# Patient Record
Sex: MALE | Race: WHITE | NOT HISPANIC OR LATINO | Employment: OTHER
[De-identification: names, ages, dates, MRNs, and addresses within clinical notes are randomized per-mention and may not be internally consistent; named-entity substitution may affect disease eponyms.]

---

## 2017-01-05 ENCOUNTER — RECORDS - HEALTHEAST (OUTPATIENT)
Dept: ADMINISTRATIVE | Facility: OTHER | Age: 77
End: 2017-01-05

## 2017-02-16 ENCOUNTER — COMMUNICATION - HEALTHEAST (OUTPATIENT)
Dept: SCHEDULING | Facility: CLINIC | Age: 77
End: 2017-02-16

## 2017-02-16 DIAGNOSIS — E11.9 DIABETES MELLITUS, TYPE 2 (H): ICD-10-CM

## 2017-02-23 ENCOUNTER — COMMUNICATION - HEALTHEAST (OUTPATIENT)
Dept: SCHEDULING | Facility: CLINIC | Age: 77
End: 2017-02-23

## 2017-02-23 DIAGNOSIS — I10 HTN (HYPERTENSION): ICD-10-CM

## 2017-02-24 ENCOUNTER — COMMUNICATION - HEALTHEAST (OUTPATIENT)
Dept: FAMILY MEDICINE | Facility: CLINIC | Age: 77
End: 2017-02-24

## 2017-02-24 DIAGNOSIS — I10 HTN (HYPERTENSION): ICD-10-CM

## 2017-05-26 ENCOUNTER — COMMUNICATION - HEALTHEAST (OUTPATIENT)
Dept: FAMILY MEDICINE | Facility: CLINIC | Age: 77
End: 2017-05-26

## 2017-05-26 DIAGNOSIS — I10 HTN (HYPERTENSION): ICD-10-CM

## 2017-05-26 DIAGNOSIS — E11.9 TYPE 2 DIABETES MELLITUS (H): ICD-10-CM

## 2017-06-09 ENCOUNTER — OFFICE VISIT - HEALTHEAST (OUTPATIENT)
Dept: FAMILY MEDICINE | Facility: CLINIC | Age: 77
End: 2017-06-09

## 2017-06-09 DIAGNOSIS — Z00.01 ENCOUNTER FOR GENERAL ADULT MEDICAL EXAMINATION WITH ABNORMAL FINDINGS: ICD-10-CM

## 2017-06-09 DIAGNOSIS — Z13.220 ENCOUNTER FOR SCREENING FOR LIPOID DISORDERS: ICD-10-CM

## 2017-06-09 DIAGNOSIS — Z12.5 SCREENING FOR MALIGNANT NEOPLASM OF PROSTATE: ICD-10-CM

## 2017-06-09 DIAGNOSIS — E11.9 DIABETES MELLITUS (H): ICD-10-CM

## 2017-06-09 DIAGNOSIS — E11.9 DIABETES MELLITUS, TYPE 2 (H): ICD-10-CM

## 2017-06-09 LAB
CHOLEST SERPL-MCNC: 122 MG/DL
FASTING STATUS PATIENT QL REPORTED: NORMAL
HBA1C MFR BLD: 7.3 % (ref 3.5–6)
HDLC SERPL-MCNC: 48 MG/DL
LDLC SERPL CALC-MCNC: 59 MG/DL
PSA SERPL-MCNC: 1 NG/ML (ref 0–6.5)
TRIGL SERPL-MCNC: 76 MG/DL

## 2017-06-09 ASSESSMENT — MIFFLIN-ST. JEOR: SCORE: 1543.39

## 2017-06-11 ENCOUNTER — AMBULATORY - HEALTHEAST (OUTPATIENT)
Dept: FAMILY MEDICINE | Facility: CLINIC | Age: 77
End: 2017-06-11

## 2017-06-11 DIAGNOSIS — E87.5 HYPERKALEMIA: ICD-10-CM

## 2017-06-23 ENCOUNTER — AMBULATORY - HEALTHEAST (OUTPATIENT)
Dept: LAB | Facility: CLINIC | Age: 77
End: 2017-06-23

## 2017-06-23 DIAGNOSIS — E87.5 HYPERKALEMIA: ICD-10-CM

## 2017-08-03 ENCOUNTER — COMMUNICATION - HEALTHEAST (OUTPATIENT)
Dept: FAMILY MEDICINE | Facility: CLINIC | Age: 77
End: 2017-08-03

## 2017-08-03 DIAGNOSIS — E78.5 HYPERLIPIDEMIA: ICD-10-CM

## 2017-08-28 ENCOUNTER — COMMUNICATION - HEALTHEAST (OUTPATIENT)
Dept: FAMILY MEDICINE | Facility: CLINIC | Age: 77
End: 2017-08-28

## 2017-08-28 DIAGNOSIS — I10 HTN (HYPERTENSION): ICD-10-CM

## 2017-08-29 ENCOUNTER — COMMUNICATION - HEALTHEAST (OUTPATIENT)
Dept: FAMILY MEDICINE | Facility: CLINIC | Age: 77
End: 2017-08-29

## 2017-08-29 ENCOUNTER — COMMUNICATION - HEALTHEAST (OUTPATIENT)
Dept: SCHEDULING | Facility: CLINIC | Age: 77
End: 2017-08-29

## 2017-10-12 ENCOUNTER — COMMUNICATION - HEALTHEAST (OUTPATIENT)
Dept: FAMILY MEDICINE | Facility: CLINIC | Age: 77
End: 2017-10-12

## 2017-10-12 DIAGNOSIS — I10 HTN (HYPERTENSION): ICD-10-CM

## 2017-10-16 ENCOUNTER — COMMUNICATION - HEALTHEAST (OUTPATIENT)
Dept: FAMILY MEDICINE | Facility: CLINIC | Age: 77
End: 2017-10-16

## 2017-10-16 DIAGNOSIS — I10 HTN (HYPERTENSION): ICD-10-CM

## 2017-10-31 ENCOUNTER — COMMUNICATION - HEALTHEAST (OUTPATIENT)
Dept: FAMILY MEDICINE | Facility: CLINIC | Age: 77
End: 2017-10-31

## 2017-10-31 DIAGNOSIS — E78.5 HYPERLIPIDEMIA: ICD-10-CM

## 2017-11-28 ENCOUNTER — COMMUNICATION - HEALTHEAST (OUTPATIENT)
Dept: LAB | Facility: CLINIC | Age: 77
End: 2017-11-28

## 2017-11-28 DIAGNOSIS — E11.9 TYPE 2 DIABETES MELLITUS (H): ICD-10-CM

## 2017-11-30 ENCOUNTER — AMBULATORY - HEALTHEAST (OUTPATIENT)
Dept: LAB | Facility: CLINIC | Age: 77
End: 2017-11-30

## 2017-11-30 DIAGNOSIS — E11.9 TYPE 2 DIABETES MELLITUS (H): ICD-10-CM

## 2017-11-30 LAB
CHOLEST SERPL-MCNC: 130 MG/DL
FASTING STATUS PATIENT QL REPORTED: YES
HBA1C MFR BLD: 7.1 % (ref 3.5–6)
HDLC SERPL-MCNC: 46 MG/DL
LDLC SERPL CALC-MCNC: 69 MG/DL
TRIGL SERPL-MCNC: 73 MG/DL

## 2018-01-04 ENCOUNTER — RECORDS - HEALTHEAST (OUTPATIENT)
Dept: ADMINISTRATIVE | Facility: OTHER | Age: 78
End: 2018-01-04

## 2018-01-25 ENCOUNTER — COMMUNICATION - HEALTHEAST (OUTPATIENT)
Dept: SCHEDULING | Facility: CLINIC | Age: 78
End: 2018-01-25

## 2018-01-25 DIAGNOSIS — E11.9 DIABETES MELLITUS, TYPE 2 (H): ICD-10-CM

## 2018-04-15 ENCOUNTER — COMMUNICATION - HEALTHEAST (OUTPATIENT)
Dept: FAMILY MEDICINE | Facility: CLINIC | Age: 78
End: 2018-04-15

## 2018-04-15 DIAGNOSIS — I10 HTN (HYPERTENSION): ICD-10-CM

## 2018-04-20 ENCOUNTER — COMMUNICATION - HEALTHEAST (OUTPATIENT)
Dept: FAMILY MEDICINE | Facility: CLINIC | Age: 78
End: 2018-04-20

## 2018-04-20 DIAGNOSIS — E78.5 HYPERLIPIDEMIA: ICD-10-CM

## 2018-04-20 DIAGNOSIS — E11.9 TYPE 2 DIABETES MELLITUS (H): ICD-10-CM

## 2018-06-19 ENCOUNTER — OFFICE VISIT - HEALTHEAST (OUTPATIENT)
Dept: FAMILY MEDICINE | Facility: CLINIC | Age: 78
End: 2018-06-19

## 2018-06-19 DIAGNOSIS — E78.5 HYPERLIPIDEMIA LDL GOAL <100: ICD-10-CM

## 2018-06-19 DIAGNOSIS — I10 ESSENTIAL HYPERTENSION: ICD-10-CM

## 2018-06-19 DIAGNOSIS — Z00.00 ROUTINE GENERAL MEDICAL EXAMINATION AT A HEALTH CARE FACILITY: ICD-10-CM

## 2018-06-19 DIAGNOSIS — E87.5 HYPERPOTASSEMIA: ICD-10-CM

## 2018-06-19 DIAGNOSIS — E11.9 DIABETES MELLITUS, TYPE 2 (H): ICD-10-CM

## 2018-06-19 LAB
ALBUMIN SERPL-MCNC: 4.2 G/DL (ref 3.5–5)
ALP SERPL-CCNC: 68 U/L (ref 45–120)
ALT SERPL W P-5'-P-CCNC: 21 U/L (ref 0–45)
ANION GAP SERPL CALCULATED.3IONS-SCNC: 11 MMOL/L (ref 5–18)
AST SERPL W P-5'-P-CCNC: 16 U/L (ref 0–40)
BILIRUB SERPL-MCNC: 1.4 MG/DL (ref 0–1)
BUN SERPL-MCNC: 12 MG/DL (ref 8–28)
CALCIUM SERPL-MCNC: 10 MG/DL (ref 8.5–10.5)
CHLORIDE BLD-SCNC: 103 MMOL/L (ref 98–107)
CHOLEST SERPL-MCNC: 116 MG/DL
CO2 SERPL-SCNC: 24 MMOL/L (ref 22–31)
CREAT SERPL-MCNC: 1.05 MG/DL (ref 0.7–1.3)
CREAT UR-MCNC: 135.4 MG/DL
FASTING STATUS PATIENT QL REPORTED: NORMAL
GFR SERPL CREATININE-BSD FRML MDRD: >60 ML/MIN/1.73M2
GLUCOSE BLD-MCNC: 188 MG/DL (ref 70–125)
HBA1C MFR BLD: 7 % (ref 3.5–6)
HDLC SERPL-MCNC: 43 MG/DL
LDLC SERPL CALC-MCNC: 54 MG/DL
MICROALBUMIN UR-MCNC: 7.35 MG/DL (ref 0–1.99)
MICROALBUMIN/CREAT UR: 54.3 MG/G
POTASSIUM BLD-SCNC: 5.8 MMOL/L (ref 3.5–5)
PROT SERPL-MCNC: 7 G/DL (ref 6–8)
PSA SERPL-MCNC: 0.9 NG/ML (ref 0–6.5)
SODIUM SERPL-SCNC: 138 MMOL/L (ref 136–145)
TRIGL SERPL-MCNC: 97 MG/DL

## 2018-06-19 ASSESSMENT — MIFFLIN-ST. JEOR: SCORE: 1546.51

## 2018-07-10 ENCOUNTER — COMMUNICATION - HEALTHEAST (OUTPATIENT)
Dept: FAMILY MEDICINE | Facility: CLINIC | Age: 78
End: 2018-07-10

## 2018-07-10 DIAGNOSIS — E11.9 DIABETES MELLITUS, TYPE 2 (H): ICD-10-CM

## 2018-07-10 DIAGNOSIS — I10 HTN (HYPERTENSION): ICD-10-CM

## 2018-07-10 DIAGNOSIS — E78.5 HYPERLIPIDEMIA: ICD-10-CM

## 2018-10-12 ENCOUNTER — COMMUNICATION - HEALTHEAST (OUTPATIENT)
Dept: FAMILY MEDICINE | Facility: CLINIC | Age: 78
End: 2018-10-12

## 2018-10-12 DIAGNOSIS — E11.9 DIABETES MELLITUS, TYPE 2 (H): ICD-10-CM

## 2018-12-17 ENCOUNTER — COMMUNICATION - HEALTHEAST (OUTPATIENT)
Dept: FAMILY MEDICINE | Facility: CLINIC | Age: 78
End: 2018-12-17

## 2018-12-17 DIAGNOSIS — E11.9 DIABETES MELLITUS, TYPE 2 (H): ICD-10-CM

## 2018-12-19 ENCOUNTER — COMMUNICATION - HEALTHEAST (OUTPATIENT)
Dept: FAMILY MEDICINE | Facility: CLINIC | Age: 78
End: 2018-12-19

## 2019-01-14 ENCOUNTER — COMMUNICATION - HEALTHEAST (OUTPATIENT)
Dept: FAMILY MEDICINE | Facility: CLINIC | Age: 79
End: 2019-01-14

## 2019-01-14 DIAGNOSIS — I10 HTN (HYPERTENSION): ICD-10-CM

## 2019-01-14 DIAGNOSIS — E11.9 TYPE 2 DIABETES MELLITUS (H): ICD-10-CM

## 2019-01-14 DIAGNOSIS — E78.5 HYPERLIPIDEMIA: ICD-10-CM

## 2019-01-14 DIAGNOSIS — E11.9 DIABETES MELLITUS, TYPE 2 (H): ICD-10-CM

## 2019-01-25 ENCOUNTER — COMMUNICATION - HEALTHEAST (OUTPATIENT)
Dept: FAMILY MEDICINE | Facility: CLINIC | Age: 79
End: 2019-01-25

## 2019-03-17 ENCOUNTER — COMMUNICATION - HEALTHEAST (OUTPATIENT)
Dept: FAMILY MEDICINE | Facility: CLINIC | Age: 79
End: 2019-03-17

## 2019-03-17 DIAGNOSIS — E11.9 TYPE 2 DIABETES MELLITUS (H): ICD-10-CM

## 2019-04-15 ENCOUNTER — RECORDS - HEALTHEAST (OUTPATIENT)
Dept: ADMINISTRATIVE | Facility: OTHER | Age: 79
End: 2019-04-15

## 2019-04-22 ENCOUNTER — RECORDS - HEALTHEAST (OUTPATIENT)
Dept: ADMINISTRATIVE | Facility: OTHER | Age: 79
End: 2019-04-22

## 2019-05-01 ENCOUNTER — RECORDS - HEALTHEAST (OUTPATIENT)
Dept: HEALTH INFORMATION MANAGEMENT | Facility: CLINIC | Age: 79
End: 2019-05-01

## 2019-05-09 ENCOUNTER — COMMUNICATION - HEALTHEAST (OUTPATIENT)
Dept: FAMILY MEDICINE | Facility: CLINIC | Age: 79
End: 2019-05-09

## 2019-05-09 DIAGNOSIS — E11.9 DIABETES MELLITUS, TYPE 2 (H): ICD-10-CM

## 2019-06-24 ENCOUNTER — OFFICE VISIT - HEALTHEAST (OUTPATIENT)
Dept: FAMILY MEDICINE | Facility: CLINIC | Age: 79
End: 2019-06-24

## 2019-06-24 DIAGNOSIS — E78.5 HYPERLIPIDEMIA, UNSPECIFIED HYPERLIPIDEMIA TYPE: ICD-10-CM

## 2019-06-24 DIAGNOSIS — N52.9 ERECTILE DYSFUNCTION, UNSPECIFIED ERECTILE DYSFUNCTION TYPE: ICD-10-CM

## 2019-06-24 DIAGNOSIS — Z12.5 SCREENING FOR PROSTATE CANCER: ICD-10-CM

## 2019-06-24 DIAGNOSIS — I10 ESSENTIAL HYPERTENSION: ICD-10-CM

## 2019-06-24 DIAGNOSIS — Z00.01 ENCOUNTER FOR GENERAL ADULT MEDICAL EXAMINATION WITH ABNORMAL FINDINGS: ICD-10-CM

## 2019-06-24 DIAGNOSIS — E11.9 TYPE 2 DIABETES MELLITUS (H): ICD-10-CM

## 2019-06-24 LAB
ALBUMIN SERPL-MCNC: 4.5 G/DL (ref 3.5–5)
ALP SERPL-CCNC: 62 U/L (ref 45–120)
ALT SERPL W P-5'-P-CCNC: 24 U/L (ref 0–45)
ANION GAP SERPL CALCULATED.3IONS-SCNC: 12 MMOL/L (ref 5–18)
AST SERPL W P-5'-P-CCNC: 19 U/L (ref 0–40)
BILIRUB SERPL-MCNC: 1.2 MG/DL (ref 0–1)
BUN SERPL-MCNC: 12 MG/DL (ref 8–28)
CALCIUM SERPL-MCNC: 10.7 MG/DL (ref 8.5–10.5)
CHLORIDE BLD-SCNC: 103 MMOL/L (ref 98–107)
CHOLEST SERPL-MCNC: 128 MG/DL
CO2 SERPL-SCNC: 24 MMOL/L (ref 22–31)
CREAT SERPL-MCNC: 1.12 MG/DL (ref 0.7–1.3)
CREAT UR-MCNC: 105.3 MG/DL
FASTING STATUS PATIENT QL REPORTED: YES
GFR SERPL CREATININE-BSD FRML MDRD: >60 ML/MIN/1.73M2
GLUCOSE BLD-MCNC: 180 MG/DL (ref 70–125)
HBA1C MFR BLD: 6.9 % (ref 3.5–6)
HDLC SERPL-MCNC: 57 MG/DL
LDLC SERPL CALC-MCNC: 57 MG/DL
MICROALBUMIN UR-MCNC: 6.76 MG/DL (ref 0–1.99)
MICROALBUMIN/CREAT UR: 64.2 MG/G
POTASSIUM BLD-SCNC: 5.3 MMOL/L (ref 3.5–5)
PROT SERPL-MCNC: 7.2 G/DL (ref 6–8)
PSA SERPL-MCNC: 1 NG/ML (ref 0–6.5)
SODIUM SERPL-SCNC: 139 MMOL/L (ref 136–145)
TRIGL SERPL-MCNC: 71 MG/DL

## 2019-06-24 RX ORDER — SILDENAFIL 100 MG/1
100 TABLET, FILM COATED ORAL PRN
Qty: 30 TABLET | Refills: 6 | Status: SHIPPED | OUTPATIENT
Start: 2019-06-24 | End: 2022-01-07

## 2019-06-24 ASSESSMENT — MIFFLIN-ST. JEOR: SCORE: 1545.15

## 2019-10-01 ENCOUNTER — COMMUNICATION - HEALTHEAST (OUTPATIENT)
Dept: FAMILY MEDICINE | Facility: CLINIC | Age: 79
End: 2019-10-01

## 2019-10-01 DIAGNOSIS — E11.9 TYPE 2 DIABETES MELLITUS (H): ICD-10-CM

## 2019-10-01 DIAGNOSIS — I10 HTN (HYPERTENSION): ICD-10-CM

## 2019-10-01 DIAGNOSIS — E78.5 HYPERLIPIDEMIA: ICD-10-CM

## 2019-12-12 ENCOUNTER — COMMUNICATION - HEALTHEAST (OUTPATIENT)
Dept: LAB | Facility: CLINIC | Age: 79
End: 2019-12-12

## 2019-12-12 DIAGNOSIS — E11.9 TYPE 2 DIABETES MELLITUS WITHOUT COMPLICATION, WITHOUT LONG-TERM CURRENT USE OF INSULIN (H): ICD-10-CM

## 2019-12-16 ENCOUNTER — AMBULATORY - HEALTHEAST (OUTPATIENT)
Dept: LAB | Facility: CLINIC | Age: 79
End: 2019-12-16

## 2019-12-16 DIAGNOSIS — E11.9 TYPE 2 DIABETES MELLITUS WITHOUT COMPLICATION, WITHOUT LONG-TERM CURRENT USE OF INSULIN (H): ICD-10-CM

## 2019-12-16 LAB
ANION GAP SERPL CALCULATED.3IONS-SCNC: 11 MMOL/L (ref 5–18)
BUN SERPL-MCNC: 17 MG/DL (ref 8–28)
CALCIUM SERPL-MCNC: 10.1 MG/DL (ref 8.5–10.5)
CHLORIDE BLD-SCNC: 106 MMOL/L (ref 98–107)
CO2 SERPL-SCNC: 25 MMOL/L (ref 22–31)
CREAT SERPL-MCNC: 1.12 MG/DL (ref 0.7–1.3)
GFR SERPL CREATININE-BSD FRML MDRD: >60 ML/MIN/1.73M2
GLUCOSE BLD-MCNC: 212 MG/DL (ref 70–125)
HBA1C MFR BLD: 7.5 % (ref 3.5–6)
POTASSIUM BLD-SCNC: 5.8 MMOL/L (ref 3.5–5)
SODIUM SERPL-SCNC: 142 MMOL/L (ref 136–145)

## 2019-12-19 ENCOUNTER — COMMUNICATION - HEALTHEAST (OUTPATIENT)
Dept: FAMILY MEDICINE | Facility: CLINIC | Age: 79
End: 2019-12-19

## 2019-12-19 DIAGNOSIS — E11.9 TYPE 2 DIABETES MELLITUS (H): ICD-10-CM

## 2019-12-26 ENCOUNTER — COMMUNICATION - HEALTHEAST (OUTPATIENT)
Dept: FAMILY MEDICINE | Facility: CLINIC | Age: 79
End: 2019-12-26

## 2020-02-18 ENCOUNTER — COMMUNICATION - HEALTHEAST (OUTPATIENT)
Dept: FAMILY MEDICINE | Facility: CLINIC | Age: 80
End: 2020-02-18

## 2020-02-18 DIAGNOSIS — E11.9 DIABETES MELLITUS, TYPE 2 (H): ICD-10-CM

## 2020-02-27 ENCOUNTER — COMMUNICATION - HEALTHEAST (OUTPATIENT)
Dept: FAMILY MEDICINE | Facility: CLINIC | Age: 80
End: 2020-02-27

## 2020-02-27 DIAGNOSIS — E11.9 TYPE 2 DIABETES MELLITUS (H): ICD-10-CM

## 2020-04-07 ENCOUNTER — COMMUNICATION - HEALTHEAST (OUTPATIENT)
Dept: FAMILY MEDICINE | Facility: CLINIC | Age: 80
End: 2020-04-07

## 2020-04-07 DIAGNOSIS — E11.9 TYPE 2 DIABETES MELLITUS (H): ICD-10-CM

## 2020-04-07 DIAGNOSIS — E11.9 DIABETES MELLITUS, TYPE 2 (H): ICD-10-CM

## 2020-04-07 DIAGNOSIS — I10 HTN (HYPERTENSION): ICD-10-CM

## 2020-04-07 DIAGNOSIS — E78.5 HYPERLIPIDEMIA: ICD-10-CM

## 2020-05-11 ENCOUNTER — COMMUNICATION - HEALTHEAST (OUTPATIENT)
Dept: FAMILY MEDICINE | Facility: CLINIC | Age: 80
End: 2020-05-11

## 2020-05-11 DIAGNOSIS — E11.9 TYPE 2 DIABETES MELLITUS (H): ICD-10-CM

## 2020-05-12 ENCOUNTER — COMMUNICATION - HEALTHEAST (OUTPATIENT)
Dept: FAMILY MEDICINE | Facility: CLINIC | Age: 80
End: 2020-05-12

## 2020-05-12 DIAGNOSIS — E11.9 TYPE 2 DIABETES MELLITUS (H): ICD-10-CM

## 2020-06-08 ENCOUNTER — RECORDS - HEALTHEAST (OUTPATIENT)
Dept: ADMINISTRATIVE | Facility: OTHER | Age: 80
End: 2020-06-08

## 2020-07-10 ENCOUNTER — OFFICE VISIT - HEALTHEAST (OUTPATIENT)
Dept: FAMILY MEDICINE | Facility: CLINIC | Age: 80
End: 2020-07-10

## 2020-07-10 DIAGNOSIS — E78.5 HYPERLIPIDEMIA: ICD-10-CM

## 2020-07-10 DIAGNOSIS — Z12.5 SCREENING FOR PROSTATE CANCER: ICD-10-CM

## 2020-07-10 DIAGNOSIS — E11.9 TYPE 2 DIABETES MELLITUS (H): ICD-10-CM

## 2020-07-10 DIAGNOSIS — E11.9 DIABETES MELLITUS, TYPE 2 (H): ICD-10-CM

## 2020-07-10 DIAGNOSIS — I10 HTN (HYPERTENSION): ICD-10-CM

## 2020-07-10 DIAGNOSIS — Z00.01 ENCOUNTER FOR GENERAL ADULT MEDICAL EXAMINATION WITH ABNORMAL FINDINGS: ICD-10-CM

## 2020-07-10 LAB
ALBUMIN SERPL-MCNC: 4.2 G/DL (ref 3.5–5)
ALP SERPL-CCNC: 68 U/L (ref 45–120)
ALT SERPL W P-5'-P-CCNC: 23 U/L (ref 0–45)
ANION GAP SERPL CALCULATED.3IONS-SCNC: 11 MMOL/L (ref 5–18)
AST SERPL W P-5'-P-CCNC: 21 U/L (ref 0–40)
BILIRUB SERPL-MCNC: 1.3 MG/DL (ref 0–1)
BUN SERPL-MCNC: 11 MG/DL (ref 8–28)
CALCIUM SERPL-MCNC: 10 MG/DL (ref 8.5–10.5)
CHLORIDE BLD-SCNC: 104 MMOL/L (ref 98–107)
CHOLEST SERPL-MCNC: 118 MG/DL
CO2 SERPL-SCNC: 25 MMOL/L (ref 22–31)
CREAT SERPL-MCNC: 1.06 MG/DL (ref 0.7–1.3)
CREAT UR-MCNC: 69.5 MG/DL
FASTING STATUS PATIENT QL REPORTED: YES
GFR SERPL CREATININE-BSD FRML MDRD: >60 ML/MIN/1.73M2
GLUCOSE BLD-MCNC: 170 MG/DL (ref 70–125)
HBA1C MFR BLD: 7.4 % (ref 3.5–6)
HDLC SERPL-MCNC: 46 MG/DL
LDLC SERPL CALC-MCNC: 48 MG/DL
MICROALBUMIN UR-MCNC: 3.06 MG/DL (ref 0–1.99)
MICROALBUMIN/CREAT UR: 44 MG/G
POTASSIUM BLD-SCNC: 5.4 MMOL/L (ref 3.5–5)
PROT SERPL-MCNC: 6.9 G/DL (ref 6–8)
PSA SERPL-MCNC: 1 NG/ML (ref 0–6.5)
SODIUM SERPL-SCNC: 140 MMOL/L (ref 136–145)
TRIGL SERPL-MCNC: 121 MG/DL

## 2020-07-10 ASSESSMENT — PATIENT HEALTH QUESTIONNAIRE - PHQ9: SUM OF ALL RESPONSES TO PHQ QUESTIONS 1-9: 0

## 2020-07-10 ASSESSMENT — MIFFLIN-ST. JEOR: SCORE: 1517.77

## 2020-07-10 ASSESSMENT — ANXIETY QUESTIONNAIRES
1. FEELING NERVOUS, ANXIOUS, OR ON EDGE: NOT AT ALL
2. NOT BEING ABLE TO STOP OR CONTROL WORRYING: NOT AT ALL

## 2020-09-24 ENCOUNTER — COMMUNICATION - HEALTHEAST (OUTPATIENT)
Dept: FAMILY MEDICINE | Facility: CLINIC | Age: 80
End: 2020-09-24

## 2020-09-24 DIAGNOSIS — E11.9 TYPE 2 DIABETES MELLITUS (H): ICD-10-CM

## 2020-11-04 ENCOUNTER — RECORDS - HEALTHEAST (OUTPATIENT)
Dept: ADMINISTRATIVE | Facility: OTHER | Age: 80
End: 2020-11-04

## 2020-11-04 ENCOUNTER — COMMUNICATION - HEALTHEAST (OUTPATIENT)
Dept: SCHEDULING | Facility: CLINIC | Age: 80
End: 2020-11-04

## 2020-11-06 ENCOUNTER — OFFICE VISIT - HEALTHEAST (OUTPATIENT)
Dept: FAMILY MEDICINE | Facility: CLINIC | Age: 80
End: 2020-11-06

## 2020-11-06 DIAGNOSIS — R74.8 ELEVATED LIVER ENZYMES: ICD-10-CM

## 2020-11-06 DIAGNOSIS — U07.1 2019 NOVEL CORONAVIRUS DISEASE (COVID-19): ICD-10-CM

## 2020-11-18 ENCOUNTER — OFFICE VISIT - HEALTHEAST (OUTPATIENT)
Dept: GERIATRICS | Facility: CLINIC | Age: 80
End: 2020-11-18

## 2020-11-18 DIAGNOSIS — R53.81 PHYSICAL DECONDITIONING: ICD-10-CM

## 2020-11-18 DIAGNOSIS — U07.1 2019 NOVEL CORONAVIRUS DISEASE (COVID-19): ICD-10-CM

## 2020-11-18 DIAGNOSIS — E43 PROTEIN-CALORIE MALNUTRITION, SEVERE (H): ICD-10-CM

## 2020-11-18 DIAGNOSIS — J12.82 PNEUMONIA DUE TO COVID-19 VIRUS: ICD-10-CM

## 2020-11-18 DIAGNOSIS — U07.1 PNEUMONIA DUE TO COVID-19 VIRUS: ICD-10-CM

## 2020-11-20 ENCOUNTER — OFFICE VISIT - HEALTHEAST (OUTPATIENT)
Dept: GERIATRICS | Facility: CLINIC | Age: 80
End: 2020-11-20

## 2020-11-20 DIAGNOSIS — J12.82 PNEUMONIA DUE TO COVID-19 VIRUS: ICD-10-CM

## 2020-11-20 DIAGNOSIS — U07.1 PNEUMONIA DUE TO COVID-19 VIRUS: ICD-10-CM

## 2020-11-20 DIAGNOSIS — E43 PROTEIN-CALORIE MALNUTRITION, SEVERE (H): ICD-10-CM

## 2020-11-20 DIAGNOSIS — U07.1 2019 NOVEL CORONAVIRUS DISEASE (COVID-19): ICD-10-CM

## 2020-11-20 DIAGNOSIS — Z71.89 ADVANCE CARE PLANNING: ICD-10-CM

## 2020-11-23 ENCOUNTER — OFFICE VISIT - HEALTHEAST (OUTPATIENT)
Dept: GERIATRICS | Facility: CLINIC | Age: 80
End: 2020-11-23

## 2020-11-23 DIAGNOSIS — J12.82 PNEUMONIA DUE TO COVID-19 VIRUS: ICD-10-CM

## 2020-11-23 DIAGNOSIS — U07.1 PNEUMONIA DUE TO COVID-19 VIRUS: ICD-10-CM

## 2020-11-23 DIAGNOSIS — R53.81 PHYSICAL DECONDITIONING: ICD-10-CM

## 2020-11-23 DIAGNOSIS — U07.1 2019 NOVEL CORONAVIRUS DISEASE (COVID-19): ICD-10-CM

## 2020-11-25 ENCOUNTER — OFFICE VISIT - HEALTHEAST (OUTPATIENT)
Dept: GERIATRICS | Facility: CLINIC | Age: 80
End: 2020-11-25

## 2020-11-25 DIAGNOSIS — E43 PROTEIN-CALORIE MALNUTRITION, SEVERE (H): ICD-10-CM

## 2020-11-25 DIAGNOSIS — J12.82 PNEUMONIA DUE TO COVID-19 VIRUS: ICD-10-CM

## 2020-11-25 DIAGNOSIS — R53.81 PHYSICAL DECONDITIONING: ICD-10-CM

## 2020-11-25 DIAGNOSIS — U07.1 2019 NOVEL CORONAVIRUS DISEASE (COVID-19): ICD-10-CM

## 2020-11-25 DIAGNOSIS — U07.1 PNEUMONIA DUE TO COVID-19 VIRUS: ICD-10-CM

## 2020-11-25 ASSESSMENT — MIFFLIN-ST. JEOR: SCORE: 1472.86

## 2020-11-26 ENCOUNTER — OFFICE VISIT - HEALTHEAST (OUTPATIENT)
Dept: GERIATRICS | Facility: CLINIC | Age: 80
End: 2020-11-26

## 2020-11-26 DIAGNOSIS — U07.1 2019 NOVEL CORONAVIRUS DISEASE (COVID-19): ICD-10-CM

## 2020-11-26 DIAGNOSIS — J12.82 PNEUMONIA DUE TO COVID-19 VIRUS: ICD-10-CM

## 2020-11-26 DIAGNOSIS — U07.1 PNEUMONIA DUE TO COVID-19 VIRUS: ICD-10-CM

## 2020-11-26 DIAGNOSIS — R53.81 PHYSICAL DECONDITIONING: ICD-10-CM

## 2020-11-27 ENCOUNTER — OFFICE VISIT - HEALTHEAST (OUTPATIENT)
Dept: GERIATRICS | Facility: CLINIC | Age: 80
End: 2020-11-27

## 2020-11-27 DIAGNOSIS — R53.81 PHYSICAL DECONDITIONING: ICD-10-CM

## 2020-11-27 DIAGNOSIS — U07.1 PNEUMONIA DUE TO COVID-19 VIRUS: ICD-10-CM

## 2020-11-27 DIAGNOSIS — E43 PROTEIN-CALORIE MALNUTRITION, SEVERE (H): ICD-10-CM

## 2020-11-27 DIAGNOSIS — U07.1 2019 NOVEL CORONAVIRUS DISEASE (COVID-19): ICD-10-CM

## 2020-11-27 DIAGNOSIS — J12.82 PNEUMONIA DUE TO COVID-19 VIRUS: ICD-10-CM

## 2020-12-02 ENCOUNTER — COMMUNICATION - HEALTHEAST (OUTPATIENT)
Dept: GERIATRICS | Facility: CLINIC | Age: 80
End: 2020-12-02

## 2020-12-02 ENCOUNTER — AMBULATORY - HEALTHEAST (OUTPATIENT)
Dept: GERIATRICS | Facility: CLINIC | Age: 80
End: 2020-12-02

## 2020-12-08 ENCOUNTER — OFFICE VISIT - HEALTHEAST (OUTPATIENT)
Dept: FAMILY MEDICINE | Facility: CLINIC | Age: 80
End: 2020-12-08

## 2020-12-08 DIAGNOSIS — R33.9 URINARY RETENTION: ICD-10-CM

## 2020-12-08 DIAGNOSIS — R53.81 PHYSICAL DECONDITIONING: ICD-10-CM

## 2020-12-08 DIAGNOSIS — E11.9 TYPE 2 DIABETES MELLITUS WITHOUT COMPLICATION, WITHOUT LONG-TERM CURRENT USE OF INSULIN (H): ICD-10-CM

## 2020-12-08 DIAGNOSIS — U07.1 PNEUMONIA DUE TO COVID-19 VIRUS: ICD-10-CM

## 2020-12-08 DIAGNOSIS — U07.1 2019 NOVEL CORONAVIRUS DISEASE (COVID-19): ICD-10-CM

## 2020-12-08 DIAGNOSIS — K59.01 SLOW TRANSIT CONSTIPATION: ICD-10-CM

## 2020-12-08 DIAGNOSIS — J12.82 PNEUMONIA DUE TO COVID-19 VIRUS: ICD-10-CM

## 2021-01-08 ENCOUNTER — COMMUNICATION - HEALTHEAST (OUTPATIENT)
Dept: FAMILY MEDICINE | Facility: CLINIC | Age: 81
End: 2021-01-08

## 2021-01-08 DIAGNOSIS — E11.9 TYPE 2 DIABETES MELLITUS (H): ICD-10-CM

## 2021-01-08 DIAGNOSIS — E78.5 HYPERLIPIDEMIA: ICD-10-CM

## 2021-01-08 DIAGNOSIS — I10 HTN (HYPERTENSION): ICD-10-CM

## 2021-01-08 DIAGNOSIS — E11.9 DIABETES MELLITUS, TYPE 2 (H): ICD-10-CM

## 2021-01-12 ENCOUNTER — COMMUNICATION - HEALTHEAST (OUTPATIENT)
Dept: FAMILY MEDICINE | Facility: CLINIC | Age: 81
End: 2021-01-12

## 2021-01-12 DIAGNOSIS — E78.5 HYPERLIPIDEMIA: ICD-10-CM

## 2021-01-12 DIAGNOSIS — I10 HTN (HYPERTENSION): ICD-10-CM

## 2021-01-12 DIAGNOSIS — E11.9 TYPE 2 DIABETES MELLITUS (H): ICD-10-CM

## 2021-01-12 DIAGNOSIS — E11.9 DIABETES MELLITUS, TYPE 2 (H): ICD-10-CM

## 2021-01-12 RX ORDER — GLIPIZIDE 10 MG/1
TABLET ORAL
Qty: 180 TABLET | Refills: 3 | Status: SHIPPED | OUTPATIENT
Start: 2021-01-12 | End: 2021-10-21

## 2021-01-12 RX ORDER — GLUCOSAMINE HCL/CHONDROITIN SU 500-400 MG
CAPSULE ORAL
Qty: 300 STRIP | Refills: 3 | Status: SHIPPED | OUTPATIENT
Start: 2021-01-12

## 2021-01-12 RX ORDER — ATENOLOL 25 MG/1
25 TABLET ORAL DAILY
Qty: 90 TABLET | Refills: 3 | Status: SHIPPED | OUTPATIENT
Start: 2021-01-12 | End: 2021-10-21

## 2021-01-12 RX ORDER — SIMVASTATIN 40 MG
TABLET ORAL
Qty: 90 TABLET | Refills: 3 | Status: SHIPPED | OUTPATIENT
Start: 2021-01-12 | End: 2021-10-21

## 2021-01-26 ENCOUNTER — COMMUNICATION - HEALTHEAST (OUTPATIENT)
Dept: FAMILY MEDICINE | Facility: CLINIC | Age: 81
End: 2021-01-26

## 2021-01-26 ENCOUNTER — COMMUNICATION - HEALTHEAST (OUTPATIENT)
Dept: LAB | Facility: CLINIC | Age: 81
End: 2021-01-26

## 2021-01-26 DIAGNOSIS — D64.9 ANEMIA, UNSPECIFIED TYPE: ICD-10-CM

## 2021-01-26 DIAGNOSIS — U07.1 2019 NOVEL CORONAVIRUS DISEASE (COVID-19): ICD-10-CM

## 2021-01-26 DIAGNOSIS — Z71.84 TRAVEL ADVICE ENCOUNTER: ICD-10-CM

## 2021-01-26 DIAGNOSIS — E11.9 TYPE 2 DIABETES MELLITUS WITHOUT COMPLICATION, WITHOUT LONG-TERM CURRENT USE OF INSULIN (H): ICD-10-CM

## 2021-01-27 ENCOUNTER — AMBULATORY - HEALTHEAST (OUTPATIENT)
Dept: LAB | Facility: CLINIC | Age: 81
End: 2021-01-27

## 2021-01-27 DIAGNOSIS — E11.9 TYPE 2 DIABETES MELLITUS WITHOUT COMPLICATION, WITHOUT LONG-TERM CURRENT USE OF INSULIN (H): ICD-10-CM

## 2021-01-27 DIAGNOSIS — Z71.84 TRAVEL ADVICE ENCOUNTER: ICD-10-CM

## 2021-01-27 DIAGNOSIS — D64.9 ANEMIA, UNSPECIFIED TYPE: ICD-10-CM

## 2021-01-27 DIAGNOSIS — U07.1 2019 NOVEL CORONAVIRUS DISEASE (COVID-19): ICD-10-CM

## 2021-01-27 LAB
ALBUMIN SERPL-MCNC: 4.4 G/DL (ref 3.5–5)
ALP SERPL-CCNC: 70 U/L (ref 45–120)
ALT SERPL W P-5'-P-CCNC: 26 U/L (ref 0–45)
ANION GAP SERPL CALCULATED.3IONS-SCNC: 12 MMOL/L (ref 5–18)
AST SERPL W P-5'-P-CCNC: 18 U/L (ref 0–40)
BASOPHILS # BLD AUTO: 0.1 THOU/UL (ref 0–0.2)
BASOPHILS NFR BLD AUTO: 1 % (ref 0–2)
BILIRUB SERPL-MCNC: 1.1 MG/DL (ref 0–1)
BUN SERPL-MCNC: 20 MG/DL (ref 8–28)
CALCIUM SERPL-MCNC: 9.9 MG/DL (ref 8.5–10.5)
CHLORIDE BLD-SCNC: 104 MMOL/L (ref 98–107)
CO2 SERPL-SCNC: 21 MMOL/L (ref 22–31)
CREAT SERPL-MCNC: 1.03 MG/DL (ref 0.7–1.3)
EOSINOPHIL # BLD AUTO: 0.4 THOU/UL (ref 0–0.4)
EOSINOPHIL NFR BLD AUTO: 5 % (ref 0–6)
ERYTHROCYTE [DISTWIDTH] IN BLOOD BY AUTOMATED COUNT: 11.8 % (ref 11–14.5)
GFR SERPL CREATININE-BSD FRML MDRD: >60 ML/MIN/1.73M2
GLUCOSE BLD-MCNC: 189 MG/DL (ref 70–125)
HBA1C MFR BLD: 7 %
HCT VFR BLD AUTO: 45.9 % (ref 40–54)
HGB BLD-MCNC: 15.4 G/DL (ref 14–18)
LYMPHOCYTES # BLD AUTO: 2.3 THOU/UL (ref 0.8–4.4)
LYMPHOCYTES NFR BLD AUTO: 29 % (ref 20–40)
MCH RBC QN AUTO: 32.3 PG (ref 27–34)
MCHC RBC AUTO-ENTMCNC: 33.5 G/DL (ref 32–36)
MCV RBC AUTO: 96 FL (ref 80–100)
MONOCYTES # BLD AUTO: 0.6 THOU/UL (ref 0–0.9)
MONOCYTES NFR BLD AUTO: 7 % (ref 2–10)
NEUTROPHILS # BLD AUTO: 4.6 THOU/UL (ref 2–7.7)
NEUTROPHILS NFR BLD AUTO: 58 % (ref 50–70)
PLATELET # BLD AUTO: 241 THOU/UL (ref 140–440)
PMV BLD AUTO: 8.2 FL (ref 7–10)
POTASSIUM BLD-SCNC: 4.8 MMOL/L (ref 3.5–5)
PROT SERPL-MCNC: 6.9 G/DL (ref 6–8)
RBC # BLD AUTO: 4.76 MILL/UL (ref 4.4–6.2)
SODIUM SERPL-SCNC: 137 MMOL/L (ref 136–145)
WBC: 7.9 THOU/UL (ref 4–11)

## 2021-01-28 ENCOUNTER — COMMUNICATION - HEALTHEAST (OUTPATIENT)
Dept: SCHEDULING | Facility: CLINIC | Age: 81
End: 2021-01-28

## 2021-02-01 ENCOUNTER — OFFICE VISIT - HEALTHEAST (OUTPATIENT)
Dept: FAMILY MEDICINE | Facility: CLINIC | Age: 81
End: 2021-02-01

## 2021-02-01 DIAGNOSIS — R20.0 NUMBNESS OF RIGHT HAND: ICD-10-CM

## 2021-02-01 DIAGNOSIS — Z11.52 ENCOUNTER FOR SCREENING FOR COVID-19: ICD-10-CM

## 2021-02-01 DIAGNOSIS — U07.1 2019 NOVEL CORONAVIRUS DISEASE (COVID-19): ICD-10-CM

## 2021-02-01 DIAGNOSIS — E11.9 TYPE 2 DIABETES MELLITUS WITHOUT COMPLICATION, WITHOUT LONG-TERM CURRENT USE OF INSULIN (H): ICD-10-CM

## 2021-02-22 ENCOUNTER — AMBULATORY - HEALTHEAST (OUTPATIENT)
Dept: NURSING | Facility: CLINIC | Age: 81
End: 2021-02-22

## 2021-02-24 ENCOUNTER — COMMUNICATION - HEALTHEAST (OUTPATIENT)
Dept: FAMILY MEDICINE | Facility: CLINIC | Age: 81
End: 2021-02-24

## 2021-02-28 ENCOUNTER — AMBULATORY - HEALTHEAST (OUTPATIENT)
Dept: FAMILY MEDICINE | Facility: CLINIC | Age: 81
End: 2021-02-28

## 2021-02-28 DIAGNOSIS — Z11.52 ENCOUNTER FOR SCREENING FOR COVID-19: ICD-10-CM

## 2021-02-28 LAB
SARS-COV-2 PCR COMMENT: ABNORMAL
SARS-COV-2 RNA SPEC QL NAA+PROBE: POSITIVE
SARS-COV-2 VIRUS SPECIMEN SOURCE: ABNORMAL

## 2021-03-01 ENCOUNTER — OFFICE VISIT - HEALTHEAST (OUTPATIENT)
Dept: FAMILY MEDICINE | Facility: CLINIC | Age: 81
End: 2021-03-01

## 2021-03-01 ENCOUNTER — COMMUNICATION - HEALTHEAST (OUTPATIENT)
Dept: SCHEDULING | Facility: CLINIC | Age: 81
End: 2021-03-01

## 2021-03-01 DIAGNOSIS — Z86.16 HISTORY OF 2019 NOVEL CORONAVIRUS DISEASE (COVID-19): ICD-10-CM

## 2021-03-01 DIAGNOSIS — Z20.822 ENCOUNTER FOR LABORATORY TESTING FOR COVID-19 VIRUS: ICD-10-CM

## 2021-03-02 ENCOUNTER — COMMUNICATION - HEALTHEAST (OUTPATIENT)
Dept: FAMILY MEDICINE | Facility: CLINIC | Age: 81
End: 2021-03-02

## 2021-03-15 ENCOUNTER — AMBULATORY - HEALTHEAST (OUTPATIENT)
Dept: NURSING | Facility: CLINIC | Age: 81
End: 2021-03-15

## 2021-03-22 ENCOUNTER — HOSPITAL ENCOUNTER (OUTPATIENT)
Dept: PHYSICAL MEDICINE AND REHAB | Facility: CLINIC | Age: 81
Discharge: HOME | End: 2021-03-22
Attending: FAMILY MEDICINE

## 2021-03-22 DIAGNOSIS — R20.0 NUMBNESS OF RIGHT HAND: ICD-10-CM

## 2021-03-23 ENCOUNTER — AMBULATORY - HEALTHEAST (OUTPATIENT)
Dept: FAMILY MEDICINE | Facility: CLINIC | Age: 81
End: 2021-03-23

## 2021-03-23 DIAGNOSIS — R20.0 NUMBNESS OF RIGHT HAND: ICD-10-CM

## 2021-04-12 ENCOUNTER — OFFICE VISIT - HEALTHEAST (OUTPATIENT)
Dept: FAMILY MEDICINE | Facility: CLINIC | Age: 81
End: 2021-04-12

## 2021-04-12 DIAGNOSIS — E11.9 TYPE 2 DIABETES MELLITUS WITHOUT COMPLICATION, WITHOUT LONG-TERM CURRENT USE OF INSULIN (H): ICD-10-CM

## 2021-04-12 DIAGNOSIS — Z01.818 PREOP GENERAL PHYSICAL EXAM: ICD-10-CM

## 2021-04-12 DIAGNOSIS — I10 HYPERTENSION, UNSPECIFIED TYPE: ICD-10-CM

## 2021-04-12 LAB
ALBUMIN SERPL-MCNC: 4.3 G/DL (ref 3.5–5)
ALP SERPL-CCNC: 69 U/L (ref 45–120)
ALT SERPL W P-5'-P-CCNC: 24 U/L (ref 0–45)
ANION GAP SERPL CALCULATED.3IONS-SCNC: 10 MMOL/L (ref 5–18)
AST SERPL W P-5'-P-CCNC: 22 U/L (ref 0–40)
ATRIAL RATE - MUSE: 61 BPM
BILIRUB SERPL-MCNC: 1.2 MG/DL (ref 0–1)
BUN SERPL-MCNC: 14 MG/DL (ref 8–28)
CALCIUM SERPL-MCNC: 10.1 MG/DL (ref 8.5–10.5)
CHLORIDE BLD-SCNC: 105 MMOL/L (ref 98–107)
CO2 SERPL-SCNC: 24 MMOL/L (ref 22–31)
CREAT SERPL-MCNC: 1.11 MG/DL (ref 0.7–1.3)
DIASTOLIC BLOOD PRESSURE - MUSE: NORMAL
ERYTHROCYTE [DISTWIDTH] IN BLOOD BY AUTOMATED COUNT: 13.5 % (ref 11–14.5)
GFR SERPL CREATININE-BSD FRML MDRD: >60 ML/MIN/1.73M2
GLUCOSE BLD-MCNC: 232 MG/DL (ref 70–125)
HCT VFR BLD AUTO: 41.6 % (ref 40–54)
HGB BLD-MCNC: 14.5 G/DL (ref 14–18)
INTERPRETATION ECG - MUSE: NORMAL
MCH RBC QN AUTO: 32.6 PG (ref 27–34)
MCHC RBC AUTO-ENTMCNC: 34.9 G/DL (ref 32–36)
MCV RBC AUTO: 94 FL (ref 80–100)
P AXIS - MUSE: 98 DEGREES
PLATELET # BLD AUTO: 205 THOU/UL (ref 140–440)
PMV BLD AUTO: 10 FL (ref 7–10)
POTASSIUM BLD-SCNC: 5.4 MMOL/L (ref 3.5–5)
PR INTERVAL - MUSE: 196 MS
PROT SERPL-MCNC: 6.9 G/DL (ref 6–8)
QRS DURATION - MUSE: 68 MS
QT - MUSE: 416 MS
QTC - MUSE: 418 MS
R AXIS - MUSE: -10 DEGREES
RBC # BLD AUTO: 4.45 MILL/UL (ref 4.4–6.2)
SODIUM SERPL-SCNC: 139 MMOL/L (ref 136–145)
SYSTOLIC BLOOD PRESSURE - MUSE: NORMAL
T AXIS - MUSE: 64 DEGREES
VENTRICULAR RATE- MUSE: 61 BPM
WBC: 7.1 THOU/UL (ref 4–11)

## 2021-04-12 ASSESSMENT — MIFFLIN-ST. JEOR: SCORE: 1535

## 2021-04-13 ENCOUNTER — COMMUNICATION - HEALTHEAST (OUTPATIENT)
Dept: FAMILY MEDICINE | Facility: CLINIC | Age: 81
End: 2021-04-13

## 2021-05-25 ENCOUNTER — RECORDS - HEALTHEAST (OUTPATIENT)
Dept: ADMINISTRATIVE | Facility: CLINIC | Age: 81
End: 2021-05-25

## 2021-05-27 ENCOUNTER — RECORDS - HEALTHEAST (OUTPATIENT)
Dept: ADMINISTRATIVE | Facility: CLINIC | Age: 81
End: 2021-05-27

## 2021-05-27 ASSESSMENT — PATIENT HEALTH QUESTIONNAIRE - PHQ9: SUM OF ALL RESPONSES TO PHQ QUESTIONS 1-9: 0

## 2021-05-28 NOTE — TELEPHONE ENCOUNTER
RN cannot approve Refill Request    RN can NOT refill this medication PCP messaged that patient is overdue for Labs and Office Visit. Last office visit: Visit date not found Last Physical: 6/19/2018 Last MTM visit: Visit date not found Last visit same specialty: 8/3/2016 Deanne Stover DO.  Next visit within 3 mo: Visit date not found  Next physical within 3 mo: Visit date not found      Janice Kulkarni, [facility] Connection Triage/Med Refill 5/9/2019    Requested Prescriptions   Pending Prescriptions Disp Refills     metFORMIN (GLUCOPHAGE) 1000 MG tablet [Pharmacy Med Name: METFORMIN HYDROCHLORIDE 1000 MG Tablet] 180 tablet 1     Sig: TAKE 1 TABLET (1,000 MG TOTAL) BY MOUTH 2 (TWO) TIMES A DAY WITH MEALS.       Metformin Refill Protocol Failed - 5/9/2019  2:30 PM        Failed - Visit with PCP or prescribing provider visit in last 6 months or next 3 months     Last office visit with prescriber/PCP: Visit date not found OR same dept: Visit date not found OR same specialty: 8/3/2016 Deanne Stover DO Last physical: Visit date not found Last MTM visit: Visit date not found         Next appt within 3 mo: Visit date not found  Next physical within 3 mo: Visit date not found  Prescriber OR PCP: Rajesh Lewis MD  Last diagnosis associated with med order: 1. Diabetes mellitus, type 2 (H)  - metFORMIN (GLUCOPHAGE) 1000 MG tablet [Pharmacy Med Name: METFORMIN HYDROCHLORIDE 1000 MG Tablet]; Take 1 tablet (1,000 mg total) by mouth 2 (two) times a day with meals.  Dispense: 180 tablet; Refill: 1     If protocol passes may refill for 12 months if within 3 months of last provider visit (or a total of 15 months).           Failed - A1C in last 6 months     Hemoglobin A1c   Date Value Ref Range Status   06/19/2018 7.0 (H) 3.5 - 6.0 % Final               Passed - Blood pressure in last 12 months     BP Readings from Last 1 Encounters:   06/19/18 138/80             Passed - LFT or AST or ALT in last 12 months     Albumin   Date  Value Ref Range Status   06/19/2018 4.2 3.5 - 5.0 g/dL Final     Bilirubin, Total   Date Value Ref Range Status   06/19/2018 1.4 (H) 0.0 - 1.0 mg/dL Final     Alkaline Phosphatase   Date Value Ref Range Status   06/19/2018 68 45 - 120 U/L Final     AST   Date Value Ref Range Status   06/19/2018 16 0 - 40 U/L Final     ALT   Date Value Ref Range Status   06/19/2018 21 0 - 45 U/L Final     Protein, Total   Date Value Ref Range Status   06/19/2018 7.0 6.0 - 8.0 g/dL Final                Passed - GFR or Serum Creatinine in last 6 months     GFR MDRD Non Af Amer   Date Value Ref Range Status   06/19/2018 >60 >60 mL/min/1.73m2 Final     GFR MDRD Af Amer   Date Value Ref Range Status   06/19/2018 >60 >60 mL/min/1.73m2 Final             Passed - Microalbumin in last year      Microalbumin, Random Urine   Date Value Ref Range Status   06/19/2018 7.35 (H) 0.00 - 1.99 mg/dL Final

## 2021-05-29 NOTE — PROGRESS NOTES
Assessment and Plan:     Dio was seen today for diabetes, hypertension, hyperlipidemia and annual wellness visit.    Type 2 diabetes mellitus (H)  -     Glycosylated Hemoglobin A1c  -     Microalbumin, Random Urine  -     Comprehensive Metabolic Panel  -     Lipid Cascade    Screening for prostate cancer  -     PSA (Prostatic-Specific Antigen), Annual Screen    Erectile dysfunction, unspecified erectile dysfunction type  -     sildenafil (VIAGRA) 100 MG tablet; Take 1 tablet (100 mg total) by mouth as needed for erectile dysfunction.    Encounter for general adult medical examination with abnormal findings    Hyperlipidemia, unspecified hyperlipidemia type    Hypertension        The patient's current medical problems were reviewed.    The following high BMI interventions were performed this visit: encouragement to exercise  The following health maintenance schedule was reviewed with the patient and provided in printed form in the after visit summary:   Health Maintenance   Topic Date Due     ZOSTER VACCINES (2 of 3) 12/19/2007     DIABETES FOOT EXAM  06/19/2019     INFLUENZA VACCINE RULE BASED (Season Ended) 08/01/2019     DIABETES HEMOGLOBIN A1C  12/24/2019     DIABETES FOLLOW-UP  12/24/2019     DIABETES OPHTHALMOLOGY EXAM  04/22/2020     DIABETES URINE MICROALBUMIN  06/24/2020     FALL RISK ASSESSMENT  06/24/2020     TD 18+ HE  02/25/2021     ADVANCE DIRECTIVES DISCUSSED WITH PATIENT  06/19/2023     PNEUMOCOCCAL POLYSACCHARIDE VACCINE AGE 65 AND OVER  Completed     PNEUMOCOCCAL CONJUGATE VACCINE FOR ADULTS (PCV13 OR PREVNAR)  Completed        Subjective:   Chief Complaint: Dio Curran is an 79 y.o. male here for an Annual Wellness visit.   HPI: He has type 2 diabetes is on metformin and glipizide.  Reports rare hyperglycemia and hypoglycemia concerns.  Overall well controlled.  No real significant complications.  Does have microalbuminuria,, this is been mild.  He is not on an ACE inhibitor or ARB.   Prefers not to add medication.  Discussed reassessment in this regard.  Denies neuropathy symptoms.  No symptoms of vascular disease he remains active.  Patient reports some erectile dysfunction concerns.  His wife for 50 years  5 years ago.  Does occasionally have a significant other and has noted concerns on occasion we discussed options for medication treatment.  Reviewed other considerations related to his health and routine health prevention.  No other significant issues.  Would like to continue with prostate cancer screening by utilizing PSA test.    Review of Systems:  Please see above.  The rest of the review of systems are negative for all systems.    Patient Care Team:  Rajesh Lewis MD as PCP - General     Patient Active Problem List   Diagnosis     Fatigue     Osteopenia     Decrease In Height     Hyperlipidemia LDL goal <100     Hypertension     Hyperkalemia     Type 2 Diabetes Mellitus     Urinary Frequency     Benign Adenoma Of The Large Intestine     Anorectal disorder     History of colonic polyps     No past medical history on file.   No past surgical history on file.   No family history on file.   Social History     Socioeconomic History     Marital status:      Spouse name: Not on file     Number of children: Not on file     Years of education: Not on file     Highest education level: Not on file   Occupational History     Not on file   Social Needs     Financial resource strain: Not on file     Food insecurity:     Worry: Not on file     Inability: Not on file     Transportation needs:     Medical: Not on file     Non-medical: Not on file   Tobacco Use     Smoking status: Former Smoker     Types: Cigarettes, Cigars     Smokeless tobacco: Never Used     Tobacco comment: cigars once in a while   Substance and Sexual Activity     Alcohol use: Not on file     Drug use: Not on file     Sexual activity: Not on file   Lifestyle     Physical activity:     Days per week: Not on file      Minutes per session: Not on file     Stress: Not on file   Relationships     Social connections:     Talks on phone: Not on file     Gets together: Not on file     Attends [employer] service: Not on file     Active member of club or organization: Not on file     Attends meetings of clubs or organizations: Not on file     Relationship status: Not on file     Intimate partner violence:     Fear of current or ex partner: Not on file     Emotionally abused: Not on file     Physically abused: Not on file     Forced sexual activity: Not on file   Other Topics Concern     Not on file   Social History Narrative     Not on file      Current Outpatient Medications   Medication Sig Dispense Refill     aspirin 81 MG EC tablet Take 81 mg by mouth 2 (two) times a day.       atenolol (TENORMIN) 25 MG tablet Take 1 tablet (25 mg total) by mouth daily. 90 tablet 3     CALCIUM CARBONATE/VITAMIN D3 (CALCIUM 600 + D,3, ORAL) Take 1 tablet by mouth daily.       cholecalciferol, vitamin D3, (VITAMIN D3) 1,000 unit capsule Take 1,000 Units by mouth daily.       CHROMIUM PICOLINATE ORAL Take 1 capsule by mouth 2 (two) times a day.       DOCOSAHEXANOIC ACID/EPA (FISH OIL ORAL) Take 2,000 mg by mouth daily.       glipiZIDE (GLUCOTROL) 10 MG tablet TAKE 1 TABLET (10 MG TOTAL) BY MOUTH 2 (TWO) TIMES A DAY 1/2 HOUR BEFORE MEALS 180 tablet 3     GLUCOSAM HCL/CHONDRO DELATORRE A/C/MN (GLUCOSAMINE-CHONDROITIN COMPLX ORAL) Take 1 capsule by mouth daily. 1500       metFORMIN (GLUCOPHAGE) 1000 MG tablet TAKE 1 TABLET (1,000 MG TOTAL) BY MOUTH 2 (TWO) TIMES A DAY WITH MEALS. 180 tablet 3     MULTIVITAMIN ORAL Take 1 tablet by mouth daily.       ONETOUCH ULTRA BLUE TEST STRIP strips USE 1 EACH AS DIRECTED 3 (THREE) TIMES A DAY. 300 strip 3     saw palmetto fruit 450 mg cap Take 2 capsules by mouth daily.       sildenafil, antihypertensive, (REVATIO) 20 mg tablet Take 1-5 tablets ( mg total) by mouth daily as needed. 60 tablet 6     simvastatin (ZOCOR) 40  "MG tablet TAKE 1 TABLET (40 MG TOTAL) BY MOUTH AT BEDTIME. 90 tablet 3     sildenafil (VIAGRA) 100 MG tablet Take 1 tablet (100 mg total) by mouth as needed for erectile dysfunction. 30 tablet 6     No current facility-administered medications for this visit.       Objective:   Vital Signs:   Visit Vitals  /68   Pulse (!) 50   Ht 5' 9.13\" (1.756 m)   Wt 184 lb 11.2 oz (83.8 kg)   SpO2 98%   BMI 27.18 kg/m         VisionScreening:  No exam data present     PHYSICAL EXAM        General Appearance:    Alert, cooperative, no distress   Eyes:   No scleral icterus or conjunctival irritation       Ears:    Normal TM's and external ear canals, both ears   Throat:   Lips, mucosa, and tongue normal; teeth and gums normal   Neck:   Supple, symmetrical, trachea midline, no adenopathy;        thyroid:  No enlargement/tenderness/nodules   Lungs:     Clear to auscultation bilaterally, respirations unlabored, no wheezes or crackles   Heart:    Regular rate and rhythm,  No murmur   Abdomen:    Soft, no distention, no tenderness on palpation, no masses, no organomegaly     Extremities:  No edema, no joint swelling or redness, no evidence of any injuries, palpable dorsalis pedis and posterior tibialis pulses bilaterally   Skin:  No concerning skin findings, no suspicious moles, no rashes   Neurologic:  On gross examination there is no motor or sensory deficit.  Patient walks with a normal gait, using the monofilament line in the feet there is no loss of sensation detectable     Genitalia:   Normal testicular anatomy, no inguinal hernias, no skin findings in the genital region   Rectal:    Normal tone, no hemorrhoids masses or other anal rectal findings, prostate has a smooth uniform consistency without nodules                 Assessment Results 6/24/2019   Activities of Daily Living No help needed   Instrumental Activities of Daily Living No help needed   Mini Cog Total Score 5   Some recent data might be hidden     A Mini-Cog " score of 0-2 suggests the possibility of dementia, score of 3-5 suggests no dementia    Identified Health Risks:     He is at risk for lack of exercise and has been provided with information to increase physical activity for the benefit of his well-being.  Patient's advanced directive was discussed and I am comfortable with the patient's wishes.

## 2021-05-31 VITALS — HEIGHT: 70 IN | WEIGHT: 183 LBS | BODY MASS INDEX: 26.2 KG/M2

## 2021-06-01 VITALS — WEIGHT: 185 LBS | BODY MASS INDEX: 27.4 KG/M2 | HEIGHT: 69 IN

## 2021-06-01 NOTE — TELEPHONE ENCOUNTER
Refill Approved    Rx renewed per Medication Renewal Policy. Medication was last renewed on 1/15/19.    Yasmeen Govea, [facility] [place] Connection Triage/Med Refill 10/2/2019     Requested Prescriptions   Pending Prescriptions Disp Refills     atenolol (TENORMIN) 25 MG tablet [Pharmacy Med Name: ATENOLOL 25 MG Tablet] 90 tablet 3     Sig: TAKE 1 TABLET (25 MG TOTAL) BY MOUTH DAILY.       Beta-Blockers Refill Protocol Passed - 10/1/2019  6:48 PM        Passed - PCP or prescribing provider visit in past 12 months or next 3 months     Last office visit with prescriber/PCP: Visit date not found OR same dept: Visit date not found OR same specialty: 8/3/2016 Deanne Stover DO  Last physical: 6/24/2019 Last MTM visit: Visit date not found   Next visit within 3 mo: Visit date not found  Next physical within 3 mo: Visit date not found  Prescriber OR PCP: Rajesh Lewis MD  Last diagnosis associated with med order: 1. HTN (hypertension)  - atenolol (TENORMIN) 25 MG tablet [Pharmacy Med Name: ATENOLOL 25 MG Tablet]; Take 1 tablet (25 mg total) by mouth daily.  Dispense: 90 tablet; Refill: 3    2. Hyperlipidemia  - simvastatin (ZOCOR) 40 MG tablet [Pharmacy Med Name: SIMVASTATIN 40 MG Tablet]; TAKE 1 TABLET (40 MG TOTAL) BY MOUTH AT BEDTIME.  Dispense: 90 tablet; Refill: 3    3. Type 2 diabetes mellitus (H)  - glipiZIDE (GLUCOTROL) 10 MG tablet [Pharmacy Med Name: GLIPIZIDE 10 MG Tablet]; TAKE 1 TABLET (10 MG TOTAL) BY MOUTH 2 (TWO) TIMES A DAY 1/2 HOUR BEFORE MEALS  Dispense: 180 tablet; Refill: 3    If protocol passes may refill for 12 months if within 3 months of last provider visit (or a total of 15 months).             Passed - Blood pressure filed in past 12 months     BP Readings from Last 1 Encounters:   06/24/19 128/68             simvastatin (ZOCOR) 40 MG tablet [Pharmacy Med Name: SIMVASTATIN 40 MG Tablet] 90 tablet 3     Sig: TAKE 1 TABLET (40 MG TOTAL) BY MOUTH AT BEDTIME.       Statins Refill Protocol (Hmg CoA  Reductase Inhibitors) Passed - 10/1/2019  6:48 PM        Passed - PCP or prescribing provider visit in past 12 months      Last office visit with prescriber/PCP: Visit date not found OR same dept: Visit date not found OR same specialty: 8/3/2016 Deanne Stover DO  Last physical: 6/24/2019 Last MTM visit: Visit date not found   Next visit within 3 mo: Visit date not found  Next physical within 3 mo: Visit date not found  Prescriber OR PCP: Rajesh Lewis MD  Last diagnosis associated with med order: 1. HTN (hypertension)  - atenolol (TENORMIN) 25 MG tablet [Pharmacy Med Name: ATENOLOL 25 MG Tablet]; Take 1 tablet (25 mg total) by mouth daily.  Dispense: 90 tablet; Refill: 3    2. Hyperlipidemia  - simvastatin (ZOCOR) 40 MG tablet [Pharmacy Med Name: SIMVASTATIN 40 MG Tablet]; TAKE 1 TABLET (40 MG TOTAL) BY MOUTH AT BEDTIME.  Dispense: 90 tablet; Refill: 3    3. Type 2 diabetes mellitus (H)  - glipiZIDE (GLUCOTROL) 10 MG tablet [Pharmacy Med Name: GLIPIZIDE 10 MG Tablet]; TAKE 1 TABLET (10 MG TOTAL) BY MOUTH 2 (TWO) TIMES A DAY 1/2 HOUR BEFORE MEALS  Dispense: 180 tablet; Refill: 3    If protocol passes may refill for 12 months if within 3 months of last provider visit (or a total of 15 months).             glipiZIDE (GLUCOTROL) 10 MG tablet [Pharmacy Med Name: GLIPIZIDE 10 MG Tablet] 180 tablet 3     Sig: TAKE 1 TABLET (10 MG TOTAL) BY MOUTH 2 (TWO) TIMES A DAY 1/2 HOUR BEFORE MEALS       Oral Hypoglycemics Refill Protocol Passed - 10/1/2019  6:48 PM        Passed - Visit with PCP or prescribing provider visit in last 6 months       Last office visit with prescriber/PCP: Visit date not found OR same dept: Visit date not found OR same specialty: 8/3/2016 Deanne Stover DO Last physical: 6/24/2019 Last MTM visit: Visit date not found         Next appt within 3 mo: Visit date not found  Next physical within 3 mo: Visit date not found  Prescriber OR PCP: Rajesh Lewis MD  Last diagnosis associated with med  order: 1. HTN (hypertension)  - atenolol (TENORMIN) 25 MG tablet [Pharmacy Med Name: ATENOLOL 25 MG Tablet]; Take 1 tablet (25 mg total) by mouth daily.  Dispense: 90 tablet; Refill: 3    2. Hyperlipidemia  - simvastatin (ZOCOR) 40 MG tablet [Pharmacy Med Name: SIMVASTATIN 40 MG Tablet]; TAKE 1 TABLET (40 MG TOTAL) BY MOUTH AT BEDTIME.  Dispense: 90 tablet; Refill: 3    3. Type 2 diabetes mellitus (H)  - glipiZIDE (GLUCOTROL) 10 MG tablet [Pharmacy Med Name: GLIPIZIDE 10 MG Tablet]; TAKE 1 TABLET (10 MG TOTAL) BY MOUTH 2 (TWO) TIMES A DAY 1/2 HOUR BEFORE MEALS  Dispense: 180 tablet; Refill: 3     If protocol passes may refill for 12 months if within 3 months of last provider visit (or a total of 15 months).           Passed - A1C in last 6 months     Hemoglobin A1c   Date Value Ref Range Status   06/24/2019 6.9 (H) 3.5 - 6.0 % Final               Passed - Microalbumin in last year      Microalbumin, Random Urine   Date Value Ref Range Status   06/24/2019 6.76 (H) 0.00 - 1.99 mg/dL Final                  Passed - Blood pressure in last year     BP Readings from Last 1 Encounters:   06/24/19 128/68             Passed - Serum creatinine in last year     Creatinine   Date Value Ref Range Status   06/24/2019 1.12 0.70 - 1.30 mg/dL Final

## 2021-06-02 ENCOUNTER — RECORDS - HEALTHEAST (OUTPATIENT)
Dept: ADMINISTRATIVE | Facility: CLINIC | Age: 81
End: 2021-06-02

## 2021-06-03 VITALS — HEIGHT: 69 IN | WEIGHT: 184.7 LBS | BODY MASS INDEX: 27.36 KG/M2

## 2021-06-04 VITALS
HEIGHT: 69 IN | SYSTOLIC BLOOD PRESSURE: 126 MMHG | BODY MASS INDEX: 26.53 KG/M2 | OXYGEN SATURATION: 99 % | WEIGHT: 179.1 LBS | HEART RATE: 62 BPM | DIASTOLIC BLOOD PRESSURE: 72 MMHG

## 2021-06-05 VITALS
WEIGHT: 179.4 LBS | SYSTOLIC BLOOD PRESSURE: 128 MMHG | HEART RATE: 59 BPM | TEMPERATURE: 96.7 F | BODY MASS INDEX: 25.68 KG/M2 | OXYGEN SATURATION: 96 % | HEIGHT: 70 IN | DIASTOLIC BLOOD PRESSURE: 70 MMHG

## 2021-06-05 VITALS
WEIGHT: 162.2 LBS | RESPIRATION RATE: 18 BRPM | OXYGEN SATURATION: 93 % | BODY MASS INDEX: 22.62 KG/M2 | SYSTOLIC BLOOD PRESSURE: 132 MMHG | TEMPERATURE: 97 F | DIASTOLIC BLOOD PRESSURE: 75 MMHG | HEART RATE: 72 BPM

## 2021-06-05 VITALS
WEIGHT: 162.2 LBS | TEMPERATURE: 95 F | HEIGHT: 71 IN | BODY MASS INDEX: 22.71 KG/M2 | DIASTOLIC BLOOD PRESSURE: 68 MMHG | SYSTOLIC BLOOD PRESSURE: 133 MMHG | HEART RATE: 77 BPM | RESPIRATION RATE: 18 BRPM | OXYGEN SATURATION: 95 %

## 2021-06-05 VITALS
HEART RATE: 77 BPM | BODY MASS INDEX: 22.62 KG/M2 | DIASTOLIC BLOOD PRESSURE: 76 MMHG | TEMPERATURE: 98 F | WEIGHT: 162.2 LBS | SYSTOLIC BLOOD PRESSURE: 138 MMHG | RESPIRATION RATE: 16 BRPM | OXYGEN SATURATION: 94 %

## 2021-06-05 VITALS
OXYGEN SATURATION: 98 % | DIASTOLIC BLOOD PRESSURE: 74 MMHG | BODY MASS INDEX: 23.85 KG/M2 | SYSTOLIC BLOOD PRESSURE: 128 MMHG | HEART RATE: 61 BPM | WEIGHT: 171 LBS

## 2021-06-05 VITALS
SYSTOLIC BLOOD PRESSURE: 118 MMHG | DIASTOLIC BLOOD PRESSURE: 60 MMHG | OXYGEN SATURATION: 96 % | RESPIRATION RATE: 16 BRPM | TEMPERATURE: 97.1 F | HEART RATE: 91 BPM | WEIGHT: 162.2 LBS | BODY MASS INDEX: 22.62 KG/M2

## 2021-06-05 VITALS — BODY MASS INDEX: 23.85 KG/M2 | WEIGHT: 171 LBS | TEMPERATURE: 96.7 F

## 2021-06-06 NOTE — TELEPHONE ENCOUNTER
RN cannot approve Refill Request    RN can NOT refill this medication Protocol failed and NO refill given.       Yasmeen Govea, [facility] Triage/Med Refill 2/27/2020    Requested Prescriptions   Pending Prescriptions Disp Refills     blood glucose test strips [Pharmacy Med Name: ACCU-CHEK PATRICIA PLUS w/Device  Kit] 100 strip 0     Sig: USE DAILY WITH TEST STRIPS TO CHECK BLOOD SUGARS.       Diabetic Supplies Refill Protocol Failed - 2/27/2020 12:38 PM        Failed - Visit with PCP or prescribing provider visit in last 6 months     Last office visit with prescriber/PCP: Visit date not found OR same dept: Visit date not found OR same specialty: 8/3/2016 Deanne Stover DO  Last physical: 6/24/2019 Last MTM visit: Visit date not found   Next visit within 3 mo: Visit date not found  Next physical within 3 mo: Visit date not found  Prescriber OR PCP: Rajesh Lewis MD  Last diagnosis associated with med order: 1. Type 2 diabetes mellitus (H)  - blood glucose test strips [Pharmacy Med Name: ACCU-CHEK PATRICIA PLUS w/Device  Kit]; USE DAILY WITH TEST STRIPS TO CHECK BLOOD SUGARS.  Dispense: 100 strip; Refill: 0    If protocol passes may refill for 12 months if within 3 months of last provider visit (or a total of 15 months).             Passed - A1C in last 6 months     Hemoglobin A1c   Date Value Ref Range Status   12/16/2019 7.5 (H) 3.5 - 6.0 % Final

## 2021-06-06 NOTE — TELEPHONE ENCOUNTER
RN cannot approve Refill Request    RN can NOT refill this medication PCP messaged that patient is overdue for Office Visit. Last office visit: Visit date not found Last Physical: 6/24/2019 Last MTM visit: Visit date not found Last visit same specialty: 8/3/2016 Deanne Stover DO.  Next visit within 3 mo: Visit date not found  Next physical within 3 mo: Visit date not found  Last OV 6/24/2019 PE     RTC about 12/24/2019  Last refill 5/10/2019 for 180/3    Sabine Peña, [facility] Triage/Med Refill 2/21/2020    Requested Prescriptions   Pending Prescriptions Disp Refills     metFORMIN (GLUCOPHAGE) 1000 MG tablet [Pharmacy Med Name: METFORMIN HYDROCHLORIDE 1000 MG Tablet] 180 tablet 3     Sig: TAKE 1 TABLET TWICE DAILY WITH MEALS       Metformin Refill Protocol Failed - 2/18/2020  8:13 PM        Failed - Visit with PCP or prescribing provider visit in last 6 months or next 3 months     Last office visit with prescriber/PCP: Visit date not found OR same dept: Visit date not found OR same specialty: 8/3/2016 Deanne Stover DO Last physical: Visit date not found Last MTM visit: Visit date not found         Next appt within 3 mo: Visit date not found  Next physical within 3 mo: Visit date not found  Prescriber OR PCP: Rajesh Lewis MD  Last diagnosis associated with med order: 1. Diabetes mellitus, type 2 (H)  - metFORMIN (GLUCOPHAGE) 1000 MG tablet [Pharmacy Med Name: METFORMIN HYDROCHLORIDE 1000 MG Tablet]; TAKE 1 TABLET TWICE DAILY WITH MEALS  Dispense: 180 tablet; Refill: 3     If protocol passes may refill for 12 months if within 3 months of last provider visit (or a total of 15 months).           Passed - Blood pressure in last 12 months     BP Readings from Last 1 Encounters:   06/24/19 128/68             Passed - LFT or AST or ALT in last 12 months     Albumin   Date Value Ref Range Status   06/24/2019 4.5 3.5 - 5.0 g/dL Final     Bilirubin, Total   Date Value Ref Range Status   06/24/2019 1.2  (H) 0.0 - 1.0 mg/dL Final     Alkaline Phosphatase   Date Value Ref Range Status   06/24/2019 62 45 - 120 U/L Final     AST   Date Value Ref Range Status   06/24/2019 19 0 - 40 U/L Final     ALT   Date Value Ref Range Status   06/24/2019 24 0 - 45 U/L Final     Protein, Total   Date Value Ref Range Status   06/24/2019 7.2 6.0 - 8.0 g/dL Final                Passed - GFR or Serum Creatinine in last 6 months     GFR MDRD Non Af Amer   Date Value Ref Range Status   12/16/2019 >60 >60 mL/min/1.73m2 Final     GFR MDRD Af Amer   Date Value Ref Range Status   12/16/2019 >60 >60 mL/min/1.73m2 Final             Passed - A1C in last 6 months     Hemoglobin A1c   Date Value Ref Range Status   12/16/2019 7.5 (H) 3.5 - 6.0 % Final               Passed - Microalbumin in last year      Microalbumin, Random Urine   Date Value Ref Range Status   06/24/2019 6.76 (H) 0.00 - 1.99 mg/dL Final

## 2021-06-07 NOTE — TELEPHONE ENCOUNTER
Who is calling:  Dio  Reason for Call:  Patient is aware that he was to have A1C done mid-March.  Should he have this lab work done now during the pandemic?  (Last A1C was done 12/19/20 with a result of 7.5)  He did schedule a physical for July 2020.  He does need refills added to four medications:     1) Atenolol  2) Glipizide  3) Metformin  4) Simvastatin    ([facility] states they have no more refills for this patient.)    Date of last appointment with primary care: 6/24/19  Okay to leave a detailed message: Yes

## 2021-06-08 NOTE — TELEPHONE ENCOUNTER
Medication Question or Clarification  Who is calling: Dio  What medication are you calling about (include dose and sig)?: Test strips, use three times a day  Who prescribed the medication?: Rajesh Lewis MD   What is your question/concern?: Patient is concerned that the 200 test strips will not be enough for a 90 day supply.  The test strips come in a package of 50. Could Dr. Lewis please send the prescription again with the higher amount of 300.    Requested Pharmacy: [facility]  Okay to leave a detailed message?: Yes

## 2021-06-08 NOTE — TELEPHONE ENCOUNTER
"FYI - Status Update  Who is Calling: Patient  Update: Patient wants to be sure his test strips order reflects his current testing schedule which is testing three times daily.  Patient was reassured that  the prescription is now set up as test three times a day.   Patient would like a call from \"Bobby\" at Dr. Lewis's office when the prescription goes to [facility].   Okay to leave a detailed message?:  Yes    "

## 2021-06-09 NOTE — PATIENT INSTRUCTIONS - HE
Advance Directive  Patient s advance directive was discussed and I am comfortable with the patient s wishes.  Patient Education   Personalized Prevention Plan  You are due for the preventive services outlined below.  Your care team is available to assist you in scheduling these services.  If you have already completed any of these items, please share that information with your care team to update in your medical record.  Health Maintenance   Topic Date Due     DIABETIC FOOT EXAM  06/19/2019     A1C  06/16/2020     LIPID  06/24/2020     MICROALBUMIN  06/24/2020     FALL RISK ASSESSMENT  06/24/2020     ZOSTER VACCINES (3 of 3) 11/22/2019     MEDICARE ANNUAL WELLNESS VISIT  06/24/2020     TD 18+ HE  02/25/2021     INFLUENZA VACCINE RULE BASED (1) 08/01/2020     BMP  12/16/2020     DIABETIC EYE EXAM  06/08/2021     ADVANCE CARE PLANNING  06/24/2024     PNEUMOCOCCAL IMMUNIZATION 65+ LOW/MEDIUM RISK  Completed

## 2021-06-09 NOTE — PROGRESS NOTES
Assessment and Plan:       Dio was seen today for annual wellness visit, diabetes, hypertension and hyperlipidemia.    Type 2 diabetes mellitus (H)  -     Lipid Cascade  -     Comprehensive Metabolic Panel  -     Microalbumin, Random Urine    Encounter for general adult medical examination with abnormal findings    HTN (hypertension)    Diabetes mellitus, type 2 (H)  -     Glycosylated Hemoglobin A1c    Hyperlipidemia    Screening for prostate cancer  -     PSA, Annual Screen (Prostatic-Specific Antigen)        The patient's current medical problems were reviewed.      The following health maintenance schedule was reviewed with the patient and provided in printed form in the after visit summary:   Health Maintenance   Topic Date Due     DIABETIC FOOT EXAM  06/19/2019     A1C  06/16/2020     LIPID  06/24/2020     MICROALBUMIN  06/24/2020     FALL RISK ASSESSMENT  06/24/2020     ZOSTER VACCINES (3 of 3) 11/22/2019     MEDICARE ANNUAL WELLNESS VISIT  06/24/2020     TD 18+ HE  02/25/2021     INFLUENZA VACCINE RULE BASED (1) 08/01/2020     BMP  12/16/2020     DIABETIC EYE EXAM  06/08/2021     ADVANCE CARE PLANNING  06/24/2024     PNEUMOCOCCAL IMMUNIZATION 65+ LOW/MEDIUM RISK  Completed        Subjective:   Chief Complaint: Dio Curran is an 80 y.o. male here for an Annual Wellness visit.   HPI: He has type 2 diabetes he is on metformin and glipizide.  He checks blood sugars and find some variability in readings reporting readings that have been as high as 300 and as low as 64.  Most readings are within the target range.  His A1c reflects ideal control.  He is well versed on management of diabetes.  He does not have significant complications resulting from diabetes at this point in time.  He had a eye examination performed in June 2020 without retinopathy, he does not have a history of chronic kidney disease but does have microalbuminuria.  He is not on an ACE inhibitor.  We discussed the possibility of utilizing  an ACE inhibitor for its renal protective effects, he is hesitant to want to change medication but willing to entertain the idea should there be any increasing amount of microalbumin in his.  He is on high-dose statin with good LDL lowering.  He denies any symptoms of neuropathy but does note that his balance just is not as good as it has been.  He does not report any other problems with his feet.  He maintains a high degree of activity in general.  He denies chest pain shortness of breath or digestive system complaints.  Review of Systems:    Please see above.  The rest of the review of systems are negative for all systems.    Patient Care Team:  Rajesh Lewis MD as PCP - General  Rajesh Lewis MD as Assigned PCP     Patient Active Problem List   Diagnosis     Fatigue     Osteopenia     Decrease In Height     Hyperlipidemia LDL goal <100     Hypertension     Hyperkalemia     Type 2 Diabetes Mellitus     Urinary Frequency     Benign Adenoma Of The Large Intestine     Anorectal disorder     History of colonic polyps     No past medical history on file.   No past surgical history on file.   No family history on file.   Social History     Socioeconomic History     Marital status:      Spouse name: Not on file     Number of children: Not on file     Years of education: Not on file     Highest education level: Not on file   Occupational History     Not on file   Social Needs     Financial resource strain: Not on file     Food insecurity     Worry: Not on file     Inability: Not on file     Transportation needs     Medical: Not on file     Non-medical: Not on file   Tobacco Use     Smoking status: Former Smoker     Types: Cigarettes, Cigars     Smokeless tobacco: Never Used     Tobacco comment: cigars once in a while   Substance and Sexual Activity     Alcohol use: Not on file     Drug use: Not on file     Sexual activity: Not on file   Lifestyle     Physical activity     Days per week: Not on file      Minutes per session: Not on file     Stress: Not on file   Relationships     Social connections     Talks on phone: Not on file     Gets together: Not on file     Attends [employer] service: Not on file     Active member of club or organization: Not on file     Attends meetings of clubs or organizations: Not on file     Relationship status: Not on file     Intimate partner violence     Fear of current or ex partner: Not on file     Emotionally abused: Not on file     Physically abused: Not on file     Forced sexual activity: Not on file   Other Topics Concern     Not on file   Social History Narrative     Not on file      Current Outpatient Medications   Medication Sig Dispense Refill     aspirin 81 MG EC tablet Take 81 mg by mouth 2 (two) times a day.       atenoloL (TENORMIN) 25 MG tablet Take 1 tablet (25 mg total) by mouth daily. 90 tablet 3     blood glucose test strips Accu-Chek Nuris  -Use to check blood sugars three times a day. Follow manufactures directions for use.Type II or unspecified type diabetes mellitus without mention of complication, not stated as uncontrolled  E11.9 300 strip 4     blood-glucose meter Misc Use daily with test strips to check blood sugars. 1 each 0     CALCIUM CARBONATE/VITAMIN D3 (CALCIUM 600 + D,3, ORAL) Take 1 tablet by mouth daily.       cholecalciferol, vitamin D3, (VITAMIN D3) 1,000 unit capsule Take 1,000 Units by mouth daily.       CHROMIUM PICOLINATE ORAL Take 1 capsule by mouth 2 (two) times a day.       DOCOSAHEXANOIC ACID/EPA (FISH OIL ORAL) Take 2,000 mg by mouth daily.       generic lancets (ACCU-CHEK SOFTCLIX LANCETS) Dispense brand per patient's insurance at pharmacy discretion. 300 each 3     glipiZIDE (GLUCOTROL) 10 MG tablet Take 1 tablet by mouth twice daily 180 tablet 3     GLUCOSAM HCL/CHONDRO DELATORRE A/C/MN (GLUCOSAMINE-CHONDROITIN COMPLX ORAL) Take 1 capsule by mouth daily. 1500       metFORMIN (GLUCOPHAGE) 1000 MG tablet Take 1 tablet (1,000 mg total) by mouth 2  "(two) times a day with meals. 180 tablet 3     MULTIVITAMIN ORAL Take 1 tablet by mouth daily.       ONETOUCH ULTRA BLUE TEST STRIP strips USE 1 EACH AS DIRECTED 3 (THREE) TIMES A DAY. 300 strip 3     saw palmetto fruit 450 mg cap Take 2 capsules by mouth daily.       sildenafil (VIAGRA) 100 MG tablet Take 1 tablet (100 mg total) by mouth as needed for erectile dysfunction. 30 tablet 6     simvastatin (ZOCOR) 40 MG tablet Take 1 tablet daily at bedtime. 90 tablet 3     No current facility-administered medications for this visit.       Objective:   Vital Signs:   Visit Vitals  /72   Pulse 62   Ht 5' 9\" (1.753 m)   Wt 179 lb 1.6 oz (81.2 kg)   SpO2 99%   BMI 26.45 kg/m           VisionScreening:  No exam data present     PHYSICAL EXAM        General Appearance:    Alert, cooperative, no distress   Eyes:   No scleral icterus or conjunctival irritation       Ears:    Normal TM's and external ear canals, both ears   Throat:   Lips, mucosa, and tongue normal; teeth and gums normal   Neck:   Supple, symmetrical, trachea midline, no adenopathy;        thyroid:  No enlargement/tenderness/nodules   Lungs:     Clear to auscultation bilaterally, respirations unlabored, no wheezes or crackles   Heart:    Regular rate and rhythm,  No murmur   Abdomen:    Soft, no distention, no tenderness on palpation, no masses, no organomegaly     Extremities:  No edema, no joint swelling or redness, no evidence of any injuries   Skin:  No concerning skin findings, no suspicious moles, no rashes   Neurologic:  On gross examination there is no motor or sensory deficit.  Patient walks with a normal gait, using monofilament line sensation is intact to the distal aspect of all toes.     Genitalia:   Normal testicular anatomy, no inguinal hernias, no skin findings in the genital region   Rectal:    Normal tone, no hemorrhoids masses or other anal rectal findings, prostate has a smooth uniform consistency without nodules   "               Assessment Results 7/10/2020   Activities of Daily Living No help needed   Instrumental Activities of Daily Living No help needed   Mini Cog Total Score 5   Some recent data might be hidden     A Mini-Cog score of 0-2 suggests the possibility of dementia, score of 3-5 suggests no dementia      Identified Health Risks:     Patient's advanced directive was discussed and I am comfortable with the patient's wishes.

## 2021-06-11 NOTE — TELEPHONE ENCOUNTER
Cath lab staff aware patient is being transferred downtown. Refill Approved    Rx renewed per Medication Renewal Policy. Medication was last renewed on 12/19/19.    Yasmeen Govea, [facility] Triage/Med Refill 9/28/2020     Requested Prescriptions   Pending Prescriptions Disp Refills     generic lancets (ACCU-CHEK SOFTCLIX LANCETS) [Pharmacy Med Name: ACCU-CHEK SOFTCLIX LANCETS   ] 300 each 3     Sig: USE AS DIRECTED  TO TEST BLOOD GLUCOSE AS NEEDED       Diabetic Supplies Refill Protocol Passed - 9/24/2020  6:00 PM        Passed - Visit with PCP or prescribing provider visit in last 6 months     Last office visit with prescriber/PCP: Visit date not found OR same dept: Visit date not found OR same specialty: Visit date not found  Last physical: 7/10/2020 Last MTM visit: Visit date not found   Next visit within 3 mo: Visit date not found  Next physical within 3 mo: Visit date not found  Prescriber OR PCP: Rajesh Lewis MD  Last diagnosis associated with med order: 1. Type 2 diabetes mellitus (H)  - ACCU-CHEK SOFTCLIX LANCETS lancets [Pharmacy Med Name: ACCU-CHEK SOFTCLIX LANCETS   ]; USE AS DIRECTED  TO TEST BLOOD GLUCOSE AS NEEDED  Dispense: 300 each; Refill: 3    If protocol passes may refill for 12 months if within 3 months of last provider visit (or a total of 15 months).             Passed - A1C in last 6 months     Hemoglobin A1c   Date Value Ref Range Status   07/10/2020 7.4 (H) 3.5 - 6.0 % Final

## 2021-06-11 NOTE — PROGRESS NOTES
Assessment and Plan:       Diagnoses and all orders for this visit:    Diabetes mellitus  -     Glycosylated Hemoglobin A1c  -     Microalbumin, Random Urine; Future    Encounter for general adult medical examination with abnormal findings    Encounter for screening for lipoid disorders  -     Lipid [place], FASTING; Future    Screening for malignant neoplasm of prostate  -     PSA (Prostatic-Specific Antigen), Annual Screen    Diabetes mellitus, type 2  -     blood glucose test (GLUCOSE BLOOD) strips; Use 1 each As Directed 3 (three) times a day.  Dispense: 300 each; Refill: 3  -     Comprehensive Metabolic Panel    Other orders  -     Cancel: Electrocardiogram Perform and Read  -     Cancel: PSA, Annual Screen (Prostatic-Specific Antigen)  -     Cancel: Lipid Cascade      The following high BMI interventions were performed this visit: encouragement to exercise  The following health maintenance schedule was reviewed with the patient and provided in printed form in the after visit summary:   Health Maintenance   Topic Date Due     DIABETES FOOT EXAM  01/02/1950     DIABETES OPHTHALMOLOGY EXAM  01/02/1950     DIABETES URINE MICROALBUMIN  06/08/2017     DIABETES HEMOGLOBIN A1C  12/09/2017     DIABETES FOLLOW-UP  12/09/2017     FALL RISK ASSESSMENT  06/09/2018     TD 18+ HE  02/25/2021     ADVANCE DIRECTIVES DISCUSSED WITH PATIENT  06/09/2022     COLONOSCOPY  03/13/2024     PNEUMOCOCCAL POLYSACCHARIDE VACCINE AGE 65 AND OVER  Completed     INFLUENZA VACCINE RULE BASED  Completed     PNEUMOCOCCAL CONJUGATE VACCINE FOR ADULTS (PCV13 OR PREVNAR)  Completed     ZOSTER VACCINE  Completed        Subjective:   Chief Complaint: Dio Curran is an 77 y.o. male here for an Annual Wellness visit.   HPI:Dio Curran is a 77 oh man who is here today for a Medicare wellness visit.  He has type 2 diabetes.  A1c today is 7.3.  Reviewed blood sugars notes some variability.  No significant concerns with hypoglycemia.  Overall  feeling well.  Discussed minor arthritis pain.  Left shoulder pain.  Discussed microalbuminuria associated with diabetes.  Recommended ACE inhibitor.  Prefers to check again today but is agreeable ultimately to starting medication if microalbuminuria persists as it has in the past.  Denies any other significant concerns reviewed routine health information as noted.    Review of Systems: Please see above.  The rest of the review of systems are negative for all systems.    Patient Care Team:  Rajesh Lewis MD as PCP - General     Patient Active Problem List   Diagnosis     Fatigue     Osteopenia     Decrease In Height     Hyperlipidemia     Hypertension     Hyperkalemia     Type 2 Diabetes Mellitus     Urinary Frequency     Benign Adenoma Of The Large Intestine     No past medical history on file.   No past surgical history on file.   No family history on file.   Social History     Social History     Marital status:      Spouse name: N/A     Number of children: N/A     Years of education: N/A     Occupational History     Not on file.     Social History Main Topics     Smoking status: Former Smoker     Types: Cigarettes, Cigars     Smokeless tobacco: Not on file      Comment: cigars once in a while     Alcohol use Not on file     Drug use: Not on file     Sexual activity: Not on file     Other Topics Concern     Not on file     Social History Narrative      Current Outpatient Prescriptions   Medication Sig Dispense Refill     aspirin 81 MG EC tablet Take 81 mg by mouth 2 (two) times a day.       atenolol (TENORMIN) 25 MG tablet TAKE 1 TABLET BY MOUTH DAILY. 90 tablet 0     blood glucose test (GLUCOSE BLOOD) strips Use 1 each As Directed 3 (three) times a day. 300 each 3     CALCIUM CARBONATE/VITAMIN D3 (CALCIUM 600 + D,3, ORAL) Take 1 tablet by mouth daily.       cholecalciferol, vitamin D3, (VITAMIN D3) 1,000 unit capsule Take 1,000 Units by mouth daily.       CHROMIUM PICOLINATE ORAL Take 1 capsule by  "mouth 2 (two) times a day.       DOCOSAHEXANOIC ACID/EPA (FISH OIL ORAL) Take 2,000 mg by mouth daily.       glipiZIDE (GLUCOTROL) 10 MG tablet TAKE 1 TABLET (10 MG TOTAL) BY MOUTH 2 (TWO) TIMES A DAY 1/2 HOUR BEFORE MEALS 180 tablet 3     GLUCOSAM HCL/CHONDRO DELATORRE A/C/MN (GLUCOSAMINE-CHONDROITIN COMPLX ORAL) Take 1 capsule by mouth daily. 1500       metFORMIN (GLUCOPHAGE) 1000 MG tablet TAKE ONE TABLET BY MOUTH TWICE DAILY WITH MEALS 180 tablet 3     MULTIVITAMIN ORAL Take 1 tablet by mouth daily.       saw palmetto fruit 450 mg cap Take 2 capsules by mouth daily.       simvastatin (ZOCOR) 40 MG tablet TAKE ONE TABLET BY MOUTH NIGHTLY AT BEDTIME 90 tablet 3     cholecalciferol, vitamin D3, (VITAMIN D3) 2,000 unit Tab Take 1 tablet by mouth daily.       No current facility-administered medications for this visit.       Objective:   Vital Signs:   Visit Vitals     /74     Pulse (!) 54     Ht 5' 9.5\" (1.765 m)     Wt 183 lb (83 kg)     SpO2 98%     BMI 26.64 kg/m2        VisionScreening:  No exam data present     PHYSICAL EXAM    General Appearance:    Alert, cooperative, no distress, appears stated age   Head:    Normocephalic, without obvious abnormality, atraumatic   Eyes:    PERRL, conjunctiva/corneas clear, EOM's intact       Ears:    Normal TM's and external ear canals, both ears   Nose:   Nares normal, septum midline, mucosa normal, no drainage    or sinus tenderness   Throat:   Lips, mucosa, and tongue normal; teeth and gums normal   Neck:   Supple, symmetrical, trachea midline, no adenopathy;        thyroid:  No enlargement/tenderness/nodules   Back:     Symmetric, no curvature, ROM normal, no CVA tenderness   Lungs:     Clear to auscultation bilaterally, respirations unlabored   Chest wall:    No tenderness or deformity   Heart:    Regular rate and rhythm, S1 and S2 normal, no murmur, rub   or gallop   Abdomen:     Soft, non-tender, bowel sounds active all four quadrants,     no masses, no organomegaly "   Genitalia:    Normal male without lesion, discharge or tenderness   Rectal:    Normal tone, normal prostate, no masses or tenderness;     Extremities:   Extremities normal, atraumatic, no cyanosis or edema   Pulses:   2+ and symmetric all extremities   Skin:   Skin color, texture, turgor normal, no rashes or lesions   Lymph nodes:   Cervical, supraclavicular, and axillary nodes normal   Neurologic:   CNII-XII intact. Normal strength, sensation and reflexes       throughout           Assessment Results 6/9/2017   Activities of Daily Living No help needed   Instrumental Activities of Daily Living No help needed   Mini Cog Total Score 5     A Mini-Cog score of 0-2 suggests the possibility of dementia, score of 3-5 suggests no dementia    Identified Health Risks:     The patient reports that he drinks more than one alcoholic drink per day but denies binge or excessive drinking. He was counseled and given information about possible harmful effects of excessive alcohol intake.  The patient reports that he does not have all recommended working emergency equipment available. He was provided with information about emergency preparedness, including smoke detectors.  Patient's advanced directive was discussed and I am comfortable with the patient's wishes.

## 2021-06-12 NOTE — PROGRESS NOTES
"Dio Curran is a 80 y.o. male who is being evaluated via a billable telephone visit.      The patient has been notified of following:     \"This telephone visit will be conducted via a call between you and your physician/provider. We have found that certain health care needs can be provided without the need for a physical exam.  This service lets us provide the care you need with a short phone conversation.  If a prescription is necessary we can send it directly to your pharmacy.  If lab work is needed we can place an order for that and you can then stop by our lab to have the test done at a later time.    Telephone visits are billed at different rates depending on your insurance coverage. During this emergency period, for some insurers they may be billed the same as an in-person visit.  Please reach out to your insurance provider with any questions.    If during the course of the call the physician/provider feels a telephone visit is not appropriate, you will not be charged for this service.\"    Patient has given verbal consent to a Telephone visit? Yes    What phone number would you like to be contacted at? [phone]    Patient would like to receive their AVS by AVS Preference: [place].    Dio Curran is a 80 y.o. male was an active individual with underlying type 2 diabetes and dyslipidemia is evaluated today via this telephone visit for follow-up on a diagnosis of COVID-19.  Patient states that in the latter days of October about 6 or 7 days ago he was pheasant hunting with his 2 adult children.  He states that he left [place] feeling somewhat poorly but nonspecific.  When he was in [place] hunting he was unable to participate in activities as usual.  Upon returning home on November 4 he underwent a Covid test and it was positive.  He was also found to have slight elevation to liver enzymes.  Patient reports he feels tired and rundown has a poor appetite and does acknowledge some weight loss over " the past week or so but otherwise no symptoms.  He has not had any recorded fevers at home.  He is not coughing or having congestion.  Reports one son feels normal and had a negative test.  His son had close contact with him.  Reports his other son has some symptoms consistent with a very mild respiratory infection and is awaiting his test.  Patient is given advice regarding further isolation.  He should isolate for a minimum of 10 days from the time of his positive test on November 4.  He can move out of isolation if he remains afebrile for more than 24 hours without medication and continues to remain symptom-free.  Patient is encouraged to maintain hydration and nutrition is much as reasonably possible and to contact me with any further concerns.        Assessment/Plan:      Diagnoses and all orders for this visit:    2019 novel coronavirus disease (COVID-19)    Elevated liver enzymes  -     Comprehensive Metabolic Panel; Future; Expected date: 11/06/2020                Phone call duration: 5 minutes    Rajesh Lewis MD

## 2021-06-12 NOTE — TELEPHONE ENCOUNTER
Please help arrange a telephone visit for patient if he tested positive for COVID-19 with Tanya Short CNP or an available provider.

## 2021-06-12 NOTE — TELEPHONE ENCOUNTER
New Appointment Needed  What is the reason for the visit:    Inpatient/ED Follow Up Appt Request  At what hospital or facility were you seen?:  [facility]  What is the reason you were seen?:  Sweating, no appetite, weight loss, tested positive for Covid-19 per patient, off and on fever. Lethargy. Elevated Liver Enzymes, per [facility] patient will need lab work.  What date were you admitted?: date: 11/04/20  What date were you discharged?: 11/04/20  What was the recommended timeframe for your follow up appointment?: 2 days  Provider Preference: PCP only, declined partner  How soon do you need to be seen?:  Within 2 days  Waitlist offered?: No  Okay to leave a detailed message:  Yes

## 2021-06-13 NOTE — PROGRESS NOTES
[facility]    [facility] [[id number][facility]   Code Status: POLST AVAILABLE    Date of visit 11/23/20, entered in computer at alternate date.  Reevaluation of 80-year-old male admitted to hospital with weakness and falls, COVID-19 positive with pulmonary infiltrates, received remdesivir dexamethasone and IV antibiotic followed with p.o. cefdinir, urinary retention required insertion of Mcdonnell catheter, hyperglycemia while on dexamethasone, stabilized and transferred to TCU for rehabilitation with persistent weakness lower extremities.  History of hypertension hyperlipidemia and diabetes mellitus.    Review of systems: Mcdonnell catheter successfully removed, denies retention, no dysuria or hematuria.  No fever sweats or chills.  Negligible cough, no sputum production.  Energy level remains diminished.  Persistent weakness bilateral lower extremities.  Questions potential length of stay, reviews accommodations he has arranged at time of discharge.  Remainder of 12 point review of systems obtained negative.    Exam: Pleasant male cognitively intact sitting in chair in no apparent distress.  Hemodynamically stable, afebrile, O2 saturation consistently greater than 95%.  Mucous membranes moist.  No HJR.  Continued diminished air movement extreme lung bases, dry crackles right lung base, no rales or wheezes.  Abdomen without masses or tenderness.  Periphery without edema.    Impression and plan:   COVID-19 related pneumonia, minimal abnormalities on pulmonary exam, oxygenation satisfactory, afebrile, albuterol inhaler available as needed.    Recent urinary retention now on Flomax, mcdonnell catheter has been removed, continues post void residuals, satisfactory output and urine flow.    Diabetes mellitus on oral agents with adequate control.    Hypertension with adequate control.    Hyperlipidemia continuing statin.    Significant weakness and history of falls, ongoing rehabilitation  indicated.      Electronically signed by: Kristie Andrew MD

## 2021-06-13 NOTE — PROGRESS NOTES
"[facility]    Facility:   [facility] [[id number]]   Code Status: UNKNOWN      CHIEF COMPLAINT/REASON FOR VISIT:  Chief Complaint   Patient presents with     Review Of Multiple Medical Conditions     TCU intake-COVID-19, pneumonia, physical deconditioning, severe malnutrition       HISTORY:      HPI: Dio is a 80 y.o. male who  has no past medical history on file. He however, has a past medical history including HTN, DMII, HLD, hypomagnesemia, ED. He was recently admitted to the hospital after suffering a fall from weakness, where he was found to have pneumonia related to COVID-19. He was admitted to [facility] from 11/10/2020 to 11/14/2020. The discharging provider summarized the hospitalization as follows:     \"80 y.o. male with a past medical history of diabetes mellitus type 2, hypertension dyslipidemia recently tested positive for covid 19, presented to the emergency room with complaints of multiple falls who was admitted on 11/10/2020 for generalized weakness, chest x-ray showed worsening infiltrates, consistent with COVID 19 pneumonia vs bacterial pneumonia. started on remdesivir, dexamethasone, antibiotics. developed urinary retention, mcdonnell catheter was placed.  Started Flomax. Void trial in 3-4 days. He is discharged to TCU.\"    Today Navneet is being evaluated for an intake into the TCU. He shares he has been overall doing ok. He is frightened by his extreme weakness and states it is unsettling to him because he has always been very healthy and is a self described jock. He shares he has been mowing his own yard by push mower which is 2+ acres, which takes him 4 hours and he does it twice weekly. He does this because he enjoys it and would like to be fit. He states he has never been weak and has never been sick like this. He is motivated to get well and stronger very quickly. Navneet shares he has not had any pain or any discomfort. He has been attempting to get better and " will continue to work with therapies. He shares he has been eating, drinking and eliminating well. However, he was screened by the dietician and found to have malnutrition. He has had a decreased appetite the past two weeks due to COVID-19, muscle mass loss, weight loss. He was approximately 179# in July 2020. He weighed 166# in the hospital. Don denies any other concerns including fevers/chills, cough or cold symptoms, headaches, vision changes, chest pain/pressure, difficulty breathing, SOB, abdominal pain, nausea, vomiting, diarrhea, dysuria, increasing weakness, increasing pain.    No past medical history on file.          No family history on file.  Social History     Socioeconomic History     Marital status:      Spouse name: Not on file     Number of children: Not on file     Years of education: Not on file     Highest education level: Not on file   Occupational History     Not on file   Social Needs     Financial resource strain: Not on file     Food insecurity     Worry: Not on file     Inability: Not on file     Transportation needs     Medical: Not on file     Non-medical: Not on file   Tobacco Use     Smoking status: Former Smoker     Types: Cigarettes, Cigars     Smokeless tobacco: Never Used     Tobacco comment: cigars once in a while   Substance and Sexual Activity     Alcohol use: Yes     Frequency: Monthly or less     Drug use: Not Currently     Sexual activity: Not on file   Lifestyle     Physical activity     Days per week: Not on file     Minutes per session: Not on file     Stress: Not on file   Relationships     Social connections     Talks on phone: Not on file     Gets together: Not on file     Attends Nondenominational service: Not on file     Active member of club or organization: Not on file     Attends meetings of clubs or organizations: Not on file     Relationship status: Not on file     Intimate partner violence     Fear of current or ex partner: Not on file     Emotionally abused: Not  on file     Physically abused: Not on file     Forced sexual activity: Not on file   Other Topics Concern     Not on file   Social History Narrative     Not on file       REVIEW OF SYSTEM:  Pertinent items are noted in HPI.    PHYSICAL EXAM:   /75   Pulse 72   Temp 97  F (36.1  C)   Resp 18   Wt 162 lb 3.2 oz (73.6 kg)   SpO2 93%   BMI 22.62 kg/m      A limited exam was performed due to recommendations for care during COVID-19 pandemic. Due to the 2020 COVID-19 pandemic, except as noted above, the patient was visually observed at a 6 foot plus distance.  An observational exam was performed in an effort to keep patient safe from COVID-19 and other communicable diseases.     General appearance: alert, appears stated age and cooperative  HEENT: Head is normocephalic with normal hair distribution. No evidence of trauma. Ears: Without lesions or deformity. No acute purulent discharge. Eyes: Conjunctivae pink with no scleral icterus or erythema. Nose: Normal. Oropharnyx: mmm.  Lungs: respirations without effort.  Extremities: extremities normal, atraumatic, no cyanosis.  Skin: Skin color, texture normal. No rashes or lesions on exposed skin.   Neurologic: Grossly normal   Psych: interacts well with caregivers, exhibits logical thought processes and connections, pleasant.      LABS:   None today.     ASSESSMENT:      ICD-10-CM    1. 2019 novel coronavirus disease (COVID-19)  U07.1    2. Protein-calorie malnutrition, severe (H)  E43    3. Pneumonia due to COVID-19 virus  U07.1     J12.89    4. Physical deconditioning  R53.81        PLAN:    COVID-19 Infection, Pneumonia due to COVID-19  -COVID-19 PCR positive.   -Albuterol 2 puffs with chamber every 4 hours as needed for shortness of breath or wheeze.   -Tylenol 650 mg by mouth four times a day for fever, pain as needed.   -Vitamin D3 5000 iu by mouth daily.   -Quarantine.   -Follow carefully.     Physical Deconditioning  -Continue PT/OT and other therapies as  per care plan.  -Encouraged good nutrition and movement habits.   -Discussed care plan and expected course of stay.   -Continue to follow-up per routine schedule or sooner if needed.     Severe Protein Calorie Malnutrition  -Eval and treat by dietician.   -Supplementation by Ensure 8 ounces two times a day.   -Follow up as needed.     Admission history and physical per MD in the next 30 days. At this time continue current care plan for all chronic medical conditions, as they are stable. Encouraged patient to engage in PT/OT for strengthening and conditioning. Encouraged patient to work closely with nursing staff to ensure any medical complaints are quickly addressed. Follow up this week or sooner if needed. Will continue to monitor patient and work with nursing staff collaboratively to work toward positive patient outcomes.    Total unit/floor time of 50 minutes time consisted of the following: time spent with patient, examination of patient, reviewing the record including pertinent labs and completing documentation. More than 50% of this time was spent in coordination of care time with nursing staff and other healthcare providers, this time was spent on discussion/counseling on current care plan including medical management of chronic health problems and acute health problems, education pertaining to plan, and discussion of the goals of care pertaining to the current outlined plan with nursing staff and patient.    Electronically signed by: Kendal Barbosa CNP

## 2021-06-13 NOTE — PROGRESS NOTES
[facility]    Facility:   [facility] [[id number]]   Code Status: FULL CODE and POLST AVAILABLE  PCP: Rajesh Lewis MD   Phone: [phone]   Fax: [phone]      CHIEF COMPLAINT/REASON FOR VISIT:  Chief Complaint   Patient presents with     Discharge Summary       HISTORY COURSE:  Dio is a 80 y.o. male who  has no past medical history on file. He however, has a past medical history including HTN, DMII, HLD, hypomagnesemia, ED. He was recently admitted to the hospital after suffering a fall from weakness, where he was found to have pneumonia related to COVID-19. He was admitted to [facility] from 11/10/2020 to 11/14/2020. The discharging provider summarized the hospitalization and previous notes.    Today Navneet is being evaluated for a discharge from the TCU.  He has undergone physical and occupational therapy programs and is graduated from the programs.  He still has not reached baseline and therefore will have home occupational and physical therapy programs.  He states that he is otherwise feeling well.  He has no acute problems or concerns to share.  He states that he has been eating, drinking and eliminating.  Nursing staff feel that he continues to only have with nurses.  He has not had any significant medication changes while in the TCU.  Navneet denies any other concerns including fevers/chills, cough or cold symptoms, headaches, vision changes, chest pain/pressure, difficulty breathing, SOB, abdominal pain, nausea, vomiting, diarrhea, dysuria, increasing weakness, increasing pain.    PHYSICAL EXAM:   /60   Pulse 91   Temp 97.1  F (36.2  C)   Resp 16   Wt 162 lb 3.2 oz (73.6 kg)   SpO2 96%   BMI 22.62 kg/m      A limited exam was performed due to recommendations for care during COVID-19 pandemic. Due to the 2020 COVID-19 pandemic, except as noted above, the patient was visually observed at a 6 foot plus distance.  An observational exam was performed in  an effort to keep patient safe from COVID-19 and other communicable diseases.     General appearance: alert, appears stated age and cooperative  HEENT: Head is normocephalic with normal hair distribution. No evidence of trauma. Ears: Without lesions or deformity. No acute purulent discharge. Eyes: Conjunctivae pink with no scleral icterus or erythema. Nose: Normal. Oropharnyx: mmm.  Lungs: respirations without effort.  Extremities: extremities normal, atraumatic, no cyanosis.  Skin: Skin color, texture normal. No rashes or lesions on exposed skin.   Neurologic: Grossly normal   Psych: interacts well with caregivers, exhibits logical thought processes and connections, pleasant.    MEDICATION LIST:  Current Outpatient Medications   Medication Sig     apixaban ANTICOAGULANT (ELIQUIS) 2.5 mg Tab tablet Take 1 tablet (2.5 mg total) by mouth 2 (two) times a day for 26 days.     aspirin 81 MG EC tablet Take 81 mg by mouth daily.      atenoloL (TENORMIN) 25 MG tablet Take 1 tablet (25 mg total) by mouth daily.     blood glucose test strips Accu-Chek Nuris  -Use to check blood sugars three times a day. Follow manufactures directions for use.Type II or unspecified type diabetes mellitus without mention of complication, not stated as uncontrolled  E11.9     blood-glucose meter Misc Use daily with test strips to check blood sugars.     calcium, as carbonate, (OS-RUBEN) 500 mg calcium (1,250 mg) tablet Take 1 tablet by mouth daily.     cholecalciferol, vitamin D3, (VITAMIN D3) 1,000 unit capsule Take 1,000 Units by mouth daily.     co-enzyme Q-10 50 mg capsule Take 50 mg by mouth daily.     fish oil-omega-3 fatty acids (FISH OIL) 300-1,000 mg capsule Take 2 g by mouth daily.     generic lancets (ACCU-CHEK SOFTCLIX LANCETS) USE AS DIRECTED  TO TEST BLOOD GLUCOSE AS NEEDED     glipiZIDE (GLUCOTROL) 10 MG tablet Take 1 tablet by mouth twice daily     glucosamine-chondroitin 500-400 mg cap Take 2 capsules by mouth daily.     metFORMIN  (GLUCOPHAGE) 1000 MG tablet Take 1 tablet (1,000 mg total) by mouth 2 (two) times a day with meals.     multivitamin therapeutic tablet Take 1 tablet by mouth daily.     ONETOUCH ULTRA BLUE TEST STRIP strips USE 1 EACH AS DIRECTED 3 (THREE) TIMES A DAY.     saw palmetto fruit 450 mg cap Take 2 capsules by mouth daily.     sildenafil (VIAGRA) 100 MG tablet Take 1 tablet (100 mg total) by mouth as needed for erectile dysfunction.     simvastatin (ZOCOR) 40 MG tablet Take 1 tablet daily at bedtime.     tamsulosin (FLOMAX) 0.4 mg cap Take 1 capsule (0.4 mg total) by mouth daily after supper.       DISCHARGE DIAGNOSIS:    ICD-10-CM    1. Pneumonia due to COVID-19 virus  U07.1     J12.89    2. Physical deconditioning  R53.81    3. 2019 novel coronavirus disease (COVID-19)  U07.1    4. Protein-calorie malnutrition, severe (H)  E43        MEDICAL EQUIPMENT NEEDS:  None today.    DISCHARGE PLAN/FACE TO FACE:  I certify that services are/were furnished while this patient was under the care of a physician and that a physician or an allowed non-physician practitioner (NPP), had a face-to-face encounter that meets the physician face-to-face encounter requirements. The encounter was in whole, or in part, related to the primary reason for home health. The patient is confined to his/her home and needs intermittent skilled nursing, physical therapy, speech-language pathology, or the continued need for occupational therapy. A plan of care has been established by a physician and is periodically reviewed by a physician.  Date of Face-to-Face Encounter: 11/27/2020    I certify that, based on my findings, the following services are medically necessary home health services: home health aid for bathing and ADLs, skilled nursing (RN) for medication set-up and teaching, symptoms and disease process monitoring and education, PT for strengthening, balance, endurance and safety within the home, OT for strengthening, ADL needs, adaptive  equipment and safety.    My clinical findings support the need for the above skilled services because: home health aid for bathing and ADLs, skilled nursing (RN) for medication set-up and teaching, symptoms and disease process monitoring and education, PT for strengthening, balance, endurance and safety within the home, OT for strengthening, ADL needs, adaptive equipment and safety.    This patient is homebound because: he is a frail elderly gentleman with multiple comorbidities and recent hospitalizations, including a hospitalization for COVID-19 and the current COVID-19 pandemic making it unsafe for him to go out into the community for services.    The patient is, or has been, under my care and I have initiated the establishment of the plan of care. This patient will be followed by a physician who will periodically review the plan of care. Schedule follow up visit with primary care provider within 7 days to reestablish care.    Electronically signed by: Kendal Barbosa CNP

## 2021-06-13 NOTE — PROGRESS NOTES
[facility]    Facility:   [facility] [[id number]]   Code Status: POLST AVAILABLE    Date of visit 11/19/20, entered in computer at later date.  Admission evaluation to TCU of 80-year-old male.  History is taken from hospital notes and from patient who provides an excellent history.  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    Past medical history: Hypertension, diabetes mellitus, hyperlipidemia, weakness lower extremities.    Current medication list, drug allergies, code status reviewed in epic.    Social: Non-smoker, no excessive alcohol intake.    Family history: Hypertension    Review of systems: Significant weakness bilateral lower extremities.  Kohli catheter remains in place, no dysuria.  Used albuterol in hospital, desires to have a albuterol available as needed.  No dyspnea at rest.  Generalized fatigue present.  No fever sweats or chills.  Decreased sensation feet.  No cognitive impairment.  Remainder of 12 point review of systems obtained negative.    Exam: Pleasant male, conversant, oriented, sitting in chair in no apparent distress.  Blood pressure 133/68, heart rate 77, temperature 95, O2 95%.  Cognitively intact.  No facial asymmetry.  No pharyngeal erythema.  No HJR or cervical adenopathy.  S1 and S2 regular.  Pulmonary exam with minimal diminished air movement lung bases, no rales rhonchi or wheezes.  Abdomen without  Patient: KRISTEL GONZALES     Acct: [id number]     Report: #[id number]  UNIT #: [id number]     : 1977    Encounter Date:2020  PRIMARY CARE: VIKI DICKSON  ***Signed***  --------------------------------------------------------------------------------------------------------------------  Chief Complaint      Encounter Date      Mar 27, 2020            Primary Care Provider      VIKI DICKSON            Referring Provider      VIKI DICKSON            Patient Complaint      Patient is complaining of      New patient here today for lung nodule            VITALS      Height 5 ft 8 in / 172.72 cm      Weight 220 lbs 0 oz / 99.022286 kg      BSA 2.13 m2      BMI 33.5 kg/m2      Temperature 96.9 F / 36.06 C - Temporal      Pulse 52      Respirations 12      Blood Pressure 130/78 Sitting, Left Arm      Pulse Oximetry 97%, room air      Comment: Rechecked temperature       98.6 oral            HPI      The patient is a new patient here for lung nodules referred by Dr. Viki Dickson.  The patient underwent a CT scan at [facility]     showing right upper lobe subcentimeter noncalcified nodules, enlarged anterior     mediastinal lymph node and mild emphysematous changes. He was referred to me for    my evaluation of it.  Unfortunately, I do not have the disk today for my review.     The patient is a former smoker, started smoking at age 18, smoked one pack per     day for 20 years and quit about one year ago. He has no previous history of     cancers or lung nodules to his knowledge. His parents suffered from asthma as     children, but there is no family history of lung cancer.  He has some high BP     and high cholesterol with gastroesophageal reflux disease and recurrent sinus     congestion.  He denies night sweats, fevers, chills, weight loss, no chronic     cough or hemoptysis. He does get short of breath with exertion and has chronic     sinus congestion and pain.             Past medical, family, social and surgical history reviewed and I agree with that    as documented in the medical chart.            Medications were reviewed and updated in the chart.            ROS      Constitutional:  Denies: Fatigue, Fever, Weight gain, Weight loss, Chills,     Insomnia, Other      Respiratory/Breathing:  Denies: Shortness of air, Wheezing, Cough, Hemoptysis,     Pleuritic pain, Other      Endocrine:  Denies: Polydipsia, Polyuria, Heat/cold intolerance, Diabetes, Other      Eyes:  Denies: Blurred vision, Vision Changes, Other      Ears, nose, mouth, throat:  Complains of: Nasal or Sinus Pain, Nasal congestion;    Denies: Mouth lesions, Thrush, Throat pain, Hoarseness, Allergies/Hay Fever,     Post Nasal Drip, Headaches, Recent Head Injury, Nose Bleeding, Neck Stiffness,     Thyroid Mass, Hearing Loss, Ear Fullness, Dry Mouth, Dry Lips, Nasal discharge,     Other      Cardiovascular:  Complains of: Dyspnea on Exertion; Denies: Palpitations,     Syncope, Claudication, Chest Pain, Wake up Gasping for air, Leg Swelling,     Irregular Heart Rate, Cyanosis, Other      Gastrointestinal:  Complains of: Abdominal pain, Reflux/Heartburn; Denies:     Nausea, Constipation, Diarrhea, Vomiting, Difficulty Swallowing, Dysphagia,     Jaundice, Bloating, Melena, Bloody stools, Other      Genitourinary:  Denies: Urinary frequency, Incontinence, Hematuria, Urgency,     Nocturia, Dysuria, Testicular problems, Other      Musculoskeletal:  Complains of: Joint Pain (Arthritis); Denies: Joint Stiffness,    Joint Swelling, Myalgias, Other      Hematologic/lymphatic:  DENIES: Lymphadenopathy, Bruising, Bleeding tendencies,     Other      Neurological:  Denies: Headache, Numbness, Weakness, Seizures, Other      Psychiatric:  Denies: Anxiety, Appropriate Effect, Depression, Other      Sleep:  No: Excessive daytime sleep, Morning Headache?, Snoring, Insomnia?, Stop    breathing at sleep?, Other      Integumentary:  Denies:  hepatosplenomegaly or masses.  Periphery without edema, trace venous stasis changes, strength diminished distal.    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    Significant weakness bilateral lower extremities and history of multiple falls, weakness continues, continue rehabilitation.    Diabetes mellitus on Metformin 1000 mg twice daily and glipizide 10 mg twice daily, Accu-Cheks will be followed.    Urinary retention on Flomax, Kohli catheter in place, trial removal over next 24 hours.    Recent hypomagnesemia replaced.    Dyslipidemia on statin.    Hypertension on Tenormin 25 mg with satisfactory control of blood pressure.    Hospital records reviewed, clinical review of patient, review of medications.      Electronically signed by: Kristie Andrew MD     Rash, Dry skin, Skin Warm to Touch, Other      Immunologic/Allergic:  Complains of: Seasonal allergies; Denies: Latex allergy,     Asthma, Urticaria, Eczema, Other      Immunization status:  Up to date            FAMILY/SOCIAL/MEDICAL HX      Surgical History:  Yes: Abdominal Surgery (hernia repair age 4 yrs old),     Orthopedic Surgery (plate screws inserted broken leg/ year 2009)      Stroke - Family Hx:  Grandparent (great grandmother)      Heart - Family Hx:  Grandparent (Both of grandfathers)      Diabetes - Family Hx:  Grandparent (Grandmother)      Cancer/Type - Family Hx:  Grandparent (grandmother throat cancer, other     grandmother lung cancer)      Other Family Medical History:  Mother (As a child), Father (Asthma as a child)      Is Father Still Living?:  Yes      Is Mother Still Living?:  No      Social History:  No Tobacco Use, No Alcohol Use, No Recreational Drug use      Smoking status:  Former smoker (Started smoking 18 years old, 1 ppd x 20 years/     quit 1 year ago)      Anticoagulation Therapy:  No      Antibiotic Prophylaxis:  No      Medical History:  Yes: Allergies, High Blood Pressure, High Cholesterol, Reflux     Disease, Sinus Trouble      Psychiatric History      none            PREVENTION      Hx Influenza Vaccination:  No      Influenza Vaccine Declined:  Yes      2 or More Falls Past Year?:  No      Fall Past Year with Injury?:  No      Hx Pneumococcal Vaccination:  No      Encouraged to follow-up with:  PCP regarding preventative exams.      Chart initiated by      Luisa Griffin MA            ALLERGIES/MEDICATIONS      Allergies:        Coded Allergies:             Penicillins (Verified  Allergy, Intermediate, 3/27/20)                  facial swells, hives      Medications      Lansoprazole (Lansoprazole) 30 Mg Capsule.dr      30 MG PO QDAY, CAP         Reported         3/27/20       Diclofenac Sodium (Voltaren 1%*) 100 Gm Gel..gram.      1 APL TOPICAL QDAY, #1 TUBE         Reported          3/27/20       Fenofibrate (Fenofibrate*) 48 Mg Tablet      48 MG PO QDAY, TAB         Reported         3/27/20       Niacin (Niacin) 100 Mg Tablet      100 MG PO QDAY, TAB         Reported         3/27/20       Nebivolol HCl (BYSTOLIC) 5 Mg Tablet      5 MG PO QDAY, #30 TAB         Reported         3/27/20       Ezetimide (Zetia) 10 Mg Tablet      10 MG PO QDAY, #30 TAB 0 Refills         Reported         3/27/20       Aspirin (Aspirin) 325 Mg Tablet      325 MG PO QDAY PRN for MILD PAIN (1-3)/FEVER, #100 TAB 0 Refills         Reported         3/27/20      Current Medications      Current Medications Reviewed 3/27/20            EXAM      VITAL SIGNS:  Reviewed.        NECK:  Supple without tracheal deviation or lymphadenopathy.  No thyromegaly     appreciated.      LYMPHATICS:  No cervical or supraclavicular lymphadenopathy.      HEENT: Pupils are equal, round and reactive to light. There is no scleral     icterus.  Nares patent without hypertrophy of the turbinates. No erythema of the    passages.  TMs are clear bilaterally with good cone of light. The posterior     pharynx is without  lesions or erythema.      RESPIRATORY:  Clear to auscultation bilaterally.  No wheezes, rales or rhonchi.     Tympanic to percussion.        CARDIOVASCULAR:  Regular rate and rhythm.  No murmurs, gallops or rubs.  No     lower extremity edema.  Equal radial pulses.        GI: Soft, nontender, nondistended, no organomegaly.  Bowel sounds present in all    four quadrants.      MUSCULOSKELETAL:  No joint effusions, erythema or warmth over the major joint     systems.      SKIN:  No rashes or lesions.      NEUROLOGIC: Cranial nerves II-XII are intact bilaterally.  Moves all ext    remities. Ambulates with ease.      PSYCH:  Appropriate mood and affect.      Vtials      Vitals:             Height 5 ft 8 in / 172.72 cm           Weight 220 lbs 0 oz / 99.304632 kg           BSA 2.13 m2           BMI 33.5 kg/m2           Temperature 96.9  F / 36.06 C - Temporal           Pulse 52           Respirations 12           Blood Pressure 130/78 Sitting, Left Arm           Pulse Oximetry 97%, room air           Comment: Rechecked temperature       98.6 oral            REVIEW      Results Reviewed      PCCS Results Reviewed?:  Yes Prev Lab Results, Yes Prev Radiology Results, Yes     Previous [facility] Records      Lab Results      I reviewed the [facility] showing biapical     subcentimeter nodules.            Assessment      Former smoker - Z87.891            CHASE (dyspnea on exertion) - R06.09            Chronic cough - R05            Wheezing - R06.2            Notes      New Medications      * Aspirin 325 MG TABLET: 325 MG PO QDAY PRN MILD PAIN (1-3)/FEVER #100      * Ezetimibe (Zetia) 10 MG TABLET: 10 MG PO QDAY #30      * Nebivolol HCl (BYSTOLIC) 5 MG TABLET: 5 MG PO QDAY #30      * NIACIN (Niacin) 100 MG TABLET: 100 MG PO QDAY      * Fenofibrate (Fenofibrate*) 48 MG TABLET: 48 MG PO QDAY      * Diclofenac Sodium (Voltaren 1%*) 100 GM GEL..GRAM.: 1 APL TOPICAL QDAY #1         Instructions: Apply 2 grams to upper extremeties. Apply 4 grams to lower ex       tremeties. Use supplied dosing card to determine amount.      * Lansoprazole 30 MG CAPSULE.DR: 30 MG PO QDAY      * MDI-Albuterol (Ventolin HFA) 8 GM HFA.AER.AD: 1 PUFFS INH Q6H PRN SHORTNESS OF      BREATH/WHEEZING #1      * Umeclidinium/Vilanterol 62.5-25 Mcg Inh (Anoro Ellipta 62.5-25 Mcg Inh) 1 EACH      BLST.W.DEV          Sample - Qty 2         Instructions: 1 puff qd         Dx: Chronic cough - R05      New Diagnostics      * PFT-Comp, PrePost,DLCO,BodyBox, 6 Months         Dx: Former smoker - Z87.891      * 6 Min Walk w O2 Titration Test, 6 Months         Dx: Former smoker - Z87.891      * Chest W/O Cont CT, 3 Months         Dx: CHASE (dyspnea on exertion) - R06.09      * Inhaler Education, Routine      New Office Procedures      * Follow Up Appointment, 4 Months      *  Inhaler Education, Routine      ASSESSMENT:       1. Former smoker, in remission for the past one year, 20 pack year smoker.      2.  Dyspnea on exertion.      3.  Intermittent chronic cough.      4. Wheezing.            PLAN:      1. Obtain PFT, six minute walk test and dedicated chest CT and follow up in t    hree months.      2. I have given an inhaler today with albuterol as well as Anoro and we gave     samples and teaching in our office.       3.  A FENO was performed and we gave handouts on COPD.            Patient Education      Education resources provided:  Yes      Patient Education Provided:  COPD, How to use an Inhaler, Lung Cancer            Electronically signed by BEKA PARKS  08/26/2020 10:55       Disclaimer: Converted document may not contain table formatting or lab diagrams. Please see [employer] for the authenticated document.

## 2021-06-13 NOTE — PROGRESS NOTES
"[facility]    Facility:   [facility]  [[id number]]   Code Status: UNKNOWN      CHIEF COMPLAINT/REASON FOR VISIT:  Chief Complaint   Patient presents with     Review Of Multiple Medical Conditions     Severe protein calorie malnutrition, ACPCOVID-19, status post COVID-19 pneumonia, physical deconditioning       HISTORY:      HPI: Dio is a 80 y.o. male who  has no past medical history on file. He however, has a past medical history including HTN, DMII, HLD, hypomagnesemia, ED. He was recently admitted to the hospital after suffering a fall from weakness, where he was found to have pneumonia related to COVID-19. He was admitted to [facility] from 11/10/2020 to 11/14/2020. The discharging provider summarized the hospitalization as follows:     \"80 y.o. male with a past medical history of diabetes mellitus type 2, hypertension dyslipidemia recently tested positive for covid 19, presented to the emergency room with complaints of multiple falls who was admitted on 11/10/2020 for generalized weakness, chest x-ray showed worsening infiltrates, consistent with COVID 19 pneumonia vs bacterial pneumonia. started on remdesivir, dexamethasone, antibiotics. developed urinary retention, mcdonnell catheter was placed.  Started Flomax. Void trial in 3-4 days. He is discharged to TCU.\"    Today Navneet is being evaluated for a routine review of multiple medical problems while in transitional care unit.  He states that he has been feeling really well.  He states that he is still very shocked at how weak he is compared to his prior state.  He states that COVID-19 really took a lot away from him.  He is looking forward to getting stronger and going home.  He has been engaged in physical and occupational therapies.  He states he has not had any pains or aches.  He states that he has been eating, drinking and eliminating well.  Nursing staff believe that he has been doing great.  Nursing staff to request " advance care planning and POLST discussion.  Navneet denies any other concerns including fevers/chills, cough or cold symptoms, headaches, vision changes, chest pain/pressure, difficulty breathing, SOB, abdominal pain, nausea, vomiting, diarrhea, dysuria, increasing weakness, increasing pain.    Advanced Care Planning  Spoke with Navneet regarding code status and advanced care planning.  Navneet consented to discussion and is aware of possible copay. They are also aware of the necessity of this discussion due to TCU admission. Discussed that he would like to have full resuscitation efforts. he would also like to have treatment if he  were to fall ill. he would like full medical treatment for all medical issues.  All of his children would decide for him if he  was unable to make medical decisions.  His children's names are Johnnie Curran and Denice Ravi.  He states that Johnnie Curran is his power of .  He does have legal papers signifying this.  Dio agrees to IV/IM antibiotics. he  is not quite sure about a feeding tube, he states that it is worth a discussion if the chance ever comes across.. There are no Spiritism obligations he  would like documented at this time.  He does want a documented that he is [employer].  He does agree to organ donation upon his death.    No past medical history on file.          No family history on file.  Social History     Socioeconomic History     Marital status:      Spouse name: Not on file     Number of children: Not on file     Years of education: Not on file     Highest education level: Not on file   Occupational History     Not on file   Social Needs     Financial resource strain: Not on file     Food insecurity     Worry: Not on file     Inability: Not on file     Transportation needs     Medical: Not on file     Non-medical: Not on file   Tobacco Use     Smoking status: Former Smoker     Types: Cigarettes, Cigars     Smokeless tobacco: Never Used     Tobacco  comment: cigars once in a while   Substance and Sexual Activity     Alcohol use: Yes     Frequency: Monthly or less     Drug use: Not Currently     Sexual activity: Not on file   Lifestyle     Physical activity     Days per week: Not on file     Minutes per session: Not on file     Stress: Not on file   Relationships     Social connections     Talks on phone: Not on file     Gets together: Not on file     Attends Sabianism service: Not on file     Active member of club or organization: Not on file     Attends meetings of clubs or organizations: Not on file     Relationship status: Not on file     Intimate partner violence     Fear of current or ex partner: Not on file     Emotionally abused: Not on file     Physically abused: Not on file     Forced sexual activity: Not on file   Other Topics Concern     Not on file   Social History Narrative     Not on file       REVIEW OF SYSTEM:  Pertinent items are noted in HPI.    PHYSICAL EXAM:   /76   Pulse 77   Temp 98  F (36.7  C)   Resp 16   Wt 162 lb 3.2 oz (73.6 kg)   SpO2 94%   BMI 22.62 kg/m      A limited exam was performed due to recommendations for care during COVID-19 pandemic. Due to the 2020 COVID-19 pandemic, except as noted above, the patient was visually observed at a 6 foot plus distance.  An observational exam was performed in an effort to keep patient safe from COVID-19 and other communicable diseases.     General appearance: alert, appears stated age and cooperative  HEENT: Head is normocephalic with normal hair distribution. No evidence of trauma. Ears: Without lesions or deformity. No acute purulent discharge. Eyes: Conjunctivae pink with no scleral icterus or erythema. Nose: Normal. Oropharnyx: mmm.  Lungs: respirations without effort.  Extremities: extremities normal, atraumatic, no cyanosis.  Skin: Skin color, texture normal. No rashes or lesions on exposed skin.   Neurologic: Grossly normal   Psych: interacts well with caregivers, exhibits  logical thought processes and connections, pleasant.      LABS:   None today.     ASSESSMENT:      ICD-10-CM    1. 2019 novel coronavirus disease (COVID-19)  U07.1    2. Pneumonia due to COVID-19 virus  U07.1     J12.89    3. Advance care planning  Z71.89    4. Protein-calorie malnutrition, severe (H)  E43        PLAN:    Care plan reviewed and remains appropriate.    COVID-19 Infection, Pneumonia due to COVID-19  -COVID-19 PCR positive.   -Albuterol 2 puffs with chamber every 4 hours as needed for shortness of breath or wheeze.   -Tylenol 650 mg by mouth four times a day for fever, pain as needed.   -Vitamin D3 5000 iu by mouth daily.   -Quarantine.   -Follow carefully.     Physical Deconditioning  -Continue PT/OT and other therapies as per care plan.  -Encouraged good nutrition and movement habits.   -Discussed care plan and expected course of stay.   -Continue to follow-up per routine schedule or sooner if needed.     Severe Protein Calorie Malnutrition  -Eval and treat by dietician.   -Supplementation by Ensure 8 ounces two times a day.   -Follow up as needed.     Advanced Care Planning  -POLST reviewed and signed.   -DNR/DNI. Full Code.   -An additional 16 minutes of time was spent discussing code status, wishes for end of life care and reviewing POLST from 1305 to 1321. POLST signed and left with nursing staff. .    Admission history and physical per MD in the next 30 days. At this time continue current care plan for all chronic medical conditions, as they are stable. Encouraged patient to engage in PT/OT for strengthening and conditioning. Encouraged patient to work closely with nursing staff to ensure any medical complaints are quickly addressed. Follow up this week or sooner if needed. Will continue to monitor patient and work with nursing staff collaboratively to work toward positive patient outcomes.    Electronically signed by: Kendal Barbosa CNP

## 2021-06-13 NOTE — PROGRESS NOTES
"Dio Curran is a 80 y.o. male who is being evaluated via a billable telephone visit.      The patient has been notified of following:     \"This telephone visit will be conducted via a call between you and your physician/provider. We have found that certain health care needs can be provided without the need for a physical exam.  This service lets us provide the care you need with a short phone conversation.  If a prescription is necessary we can send it directly to your pharmacy.  If lab work is needed we can place an order for that and you can then stop by our lab to have the test done at a later time.    Telephone visits are billed at different rates depending on your insurance coverage. During this emergency period, for some insurers they may be billed the same as an in-person visit.  Please reach out to your insurance provider with any questions.    If during the course of the call the physician/provider feels a telephone visit is not appropriate, you will not be charged for this service.\"    Patient has given verbal consent to a Telephone visit? Yes    What phone number would you like to be contacted at? [phone]    Patient would like to receive their AVS by AVS Preference: [place].    Additional provider notes:   Hospital Follow-up Visit:    Hospital/Nursing Home/IP Rehab Facility: [facility]  Date of Admission: 11/10/2020  Date of Discharge: 11/14/2020  Reason(s) for Admission: COVID-19    Was your hospitalization related to COVID-19? Yes   How are you feeling today? Better  In the past 24 hours have you had shortness of breath when speaking, walking, or climbing stairs? I don't have breathing problems  Do you have a cough? I don't have a cough  When is the last time you had a fever greater than 100? Never recent  Are you having any other symptoms? Fatigue   Do you have any other stressors you would like to discuss with your provider? No      Problems taking medications regularly:  " None  Medication changes since discharge: None  Problems adhering to non-medication therapy:  None    Summary of hospitalization:  [facility] hospital discharge summary reviewed  Diagnostic Tests/Treatments reviewed.  Follow up needed: none  Other Healthcare Providers Involved in Patient s Care:         Homecare, Physical Therapy and TCU [facility]  Update since discharge: improved.      Post Discharge Medication Reconciliation: discharge medications reconciled, continue medications without change.  Plan of care communicated with patient          Navneet developed COVID-19 in early November.  He likely contracted this while traveling to [place] about that time.  I spoke with him via phone after his diagnosis on November 6, 2020.  At that time he was tired and weak but having minimal symptoms.  In the days following that telephone conversation he developed progressive weakness leading to balance difficulty and falls.  This necessitated hospitalization.  He was found to have lung findings on x-ray consistent with Covid pneumonia.  Additional imaging studies showed no central neurologic concerns or evidence of other neurologic compromise in the spine.  He did not sustain any significant injuries during any of the falls that resulted during his balance difficulties.  He was hospitalized from November 10-14 at [facility].  He was treated with standard medications.  He developed some urinary difficulties and was started on tamsulosin which helped improve urination tremendously.  He had constipation and was treated and has since resolved.  Laboratory tests, imaging tests and consultations are reviewed.  He was discharged to transitional care at [facility] and has now returned home.  Patient reports it was somewhat of a traumatic experience being in transitional care more so because of lack of assistance when needed such as for bowel movements.  At this point in time he reports he is urinating well having no  issues with bowel movements appetite is good his balance is improving but he still has ongoing issues.  He does have home care that is continuing to work with him specifically on therapy.  Reviewed his clinical course and answered his questions today.  Ultimately no concern is identified that would require further action at this time.  He is elected to stop the tamsulosin and monitor his urinary symptoms.  No other medication changes have recently been made nor are needed at this time.  Discussed follow-up in about 1 month to reassess diabetes and his considerations overall.  Patient will plan to come in for a lab visit and we can discuss further via phone following that 1 month from now.        Assessment/Plan:              Diagnoses and all orders for this visit:    2019 novel coronavirus disease (COVID-19)    Pneumonia due to COVID-19 virus    Physical deconditioning    Type 2 diabetes mellitus without complication, without long-term current use of insulin (H)    Slow transit constipation    Urinary retention      Phone call duration:  15 minutes    Rajesh Lewis MD

## 2021-06-13 NOTE — PROGRESS NOTES
"[facility]    Facility:   [facility] TCU [[id number]]   Code Status: UNKNOWN      CHIEF COMPLAINT/REASON FOR VISIT:  Chief Complaint   Patient presents with     Review Of Multiple Medical Conditions     Severe protein calorie malnutrition, COVID-19, status post COVID-19 pneumonia, physical deconditioning       HISTORY:      HPI: Dio is a 80 y.o. male who  has no past medical history on file. He however, has a past medical history including HTN, DMII, HLD, hypomagnesemia, ED. He was recently admitted to the hospital after suffering a fall from weakness, where he was found to have pneumonia related to COVID-19. He was admitted to [facility] from 11/10/2020 to 11/14/2020. The discharging provider summarized the hospitalization as follows:     \"80 y.o. male with a past medical history of diabetes mellitus type 2, hypertension dyslipidemia recently tested positive for covid 19, presented to the emergency room with complaints of multiple falls who was admitted on 11/10/2020 for generalized weakness, chest x-ray showed worsening infiltrates, consistent with COVID 19 pneumonia vs bacterial pneumonia. started on remdesivir, dexamethasone, antibiotics. developed urinary retention, mcdonnell catheter was placed.  Started Flomax. Void trial in 3-4 days. He is discharged to TCU.\"    Today Navneet is being evaluated for a routine review of multiple medical problems while in transitional care unit.  Navneet continues to get better and better, however he is very upset at the pace of progress. He was hopeful that he would be bouncing right back. Navneet has no aches or pains. He has been working with therapies and is making progress. He has been eating, drinking and eliminating well. Discharge planning to commence. Navneet denies any other concerns including fevers/chills, cough or cold symptoms, headaches, vision changes, chest pain/pressure, difficulty breathing, SOB, abdominal pain, nausea, vomiting, diarrhea, " "dysuria, increasing weakness, increasing pain.    No past medical history on file.          No family history on file.  Social History     Socioeconomic History     Marital status:      Spouse name: Not on file     Number of children: Not on file     Years of education: Not on file     Highest education level: Not on file   Occupational History     Not on file   Social Needs     Financial resource strain: Not on file     Food insecurity     Worry: Not on file     Inability: Not on file     Transportation needs     Medical: Not on file     Non-medical: Not on file   Tobacco Use     Smoking status: Former Smoker     Types: Cigarettes, Cigars     Smokeless tobacco: Never Used     Tobacco comment: cigars once in a while   Substance and Sexual Activity     Alcohol use: Yes     Frequency: Monthly or less     Drug use: Not Currently     Sexual activity: Not on file   Lifestyle     Physical activity     Days per week: Not on file     Minutes per session: Not on file     Stress: Not on file   Relationships     Social connections     Talks on phone: Not on file     Gets together: Not on file     Attends Hoahaoism service: Not on file     Active member of club or organization: Not on file     Attends meetings of clubs or organizations: Not on file     Relationship status: Not on file     Intimate partner violence     Fear of current or ex partner: Not on file     Emotionally abused: Not on file     Physically abused: Not on file     Forced sexual activity: Not on file   Other Topics Concern     Not on file   Social History Narrative     Not on file       REVIEW OF SYSTEM:  Pertinent items are noted in HPI.    PHYSICAL EXAM:   /68   Pulse 77   Temp (!) 95  F (35  C)   Resp 18   Ht 5' 11\" (1.803 m)   Wt 162 lb 3.2 oz (73.6 kg)   SpO2 95%   BMI 22.62 kg/m      A limited exam was performed due to recommendations for care during COVID-19 pandemic. Due to the 2020 COVID-19 pandemic, except as noted above, the " patient was visually observed at a 6 foot plus distance.  An observational exam was performed in an effort to keep patient safe from COVID-19 and other communicable diseases.     General appearance: alert, appears stated age and cooperative  HEENT: Head is normocephalic with normal hair distribution. No evidence of trauma. Ears: Without lesions or deformity. No acute purulent discharge. Eyes: Conjunctivae pink with no scleral icterus or erythema. Nose: Normal. Oropharnyx: mmm.  Lungs: respirations without effort.  Extremities: extremities normal, atraumatic, no cyanosis.  Skin: Skin color, texture normal. No rashes or lesions on exposed skin.   Neurologic: Grossly normal   Psych: interacts well with caregivers, exhibits logical thought processes and connections, pleasant.      LABS:   None today.     ASSESSMENT:      ICD-10-CM    1. 2019 novel coronavirus disease (COVID-19)  U07.1    2. Pneumonia due to COVID-19 virus  U07.1     J12.89    3. Protein-calorie malnutrition, severe (H)  E43    4. Physical deconditioning  R53.81        PLAN:    Care plan reviewed and remains appropriate. Therapies to continue.     COVID-19 Infection, Pneumonia due to COVID-19  -COVID-19 PCR positive.   -Albuterol 2 puffs with chamber every 4 hours as needed for shortness of breath or wheeze.   -Tylenol 650 mg by mouth four times a day for fever, pain as needed.   -Vitamin D3 5000 iu by mouth daily.   -Quarantine.   -Follow carefully.     Physical Deconditioning  -Continue PT/OT and other therapies as per care plan.  -Encouraged good nutrition and movement habits.   -Discussed care plan and expected course of stay.   -Continue to follow-up per routine schedule or sooner if needed.     Severe Protein Calorie Malnutrition  -Eval and treat by dietician.   -Supplementation by Ensure 8 ounces two times a day.   -Follow up as needed.     Otherwise continue current care plan for all other chronic medical conditions, as they are stable.  Encouraged patient to engage in healthy lifestyle behaviors such as engaging in social activities, exercising (PT/OT), eating well, and following care plan. Follow up for routine check-up, or sooner if needed. Will continue to monitor patient and work with nursing staff collaboratively to work toward positive patient outcomes.    Electronically signed by: Kendal Barbosa CNP

## 2021-06-13 NOTE — PROGRESS NOTES
[facility] Patient Outreach:     Discharge Date::  11/27/20      Reason for TCU stay (discharge diagnosis)::  COVID-19, pneumonia      Are you feeling better, the same or worse since your discharge?:  Patient is feeling better          As part of your discharge plan, did they discuss home care with you?: Yes        Have your seen them yet, or are they scheduled to visit?: Yes                Do you have any follow up visits scheduled with your PCP or Specialist?:  Yes, with PCP      (RN) Is it scheduled soon enough (3-5 days)?: No        (RN) Is the patient okay with moving appointment up (if RN feels appropriate)?: No (Patient will be seeing PCP on 12/8/20.  )

## 2021-06-14 NOTE — TELEPHONE ENCOUNTER
Orders being requested: Covid test added to 1/27 Lab appointment.  Reason service is needed/diagnosis: Patient already scheduled for Lab on 1/27, will be traveling soon.   When are orders needed by: Today 1/26 or early morning 1/27.  Where to send Orders: Other:  Added to appointment schedule?  Okay to leave detailed message?  Yes

## 2021-06-14 NOTE — TELEPHONE ENCOUNTER
Patient is going to [place] and would like a covid test. He is coming in tomorrow for lab work I at 8:00am . Does he need an appointment with you?

## 2021-06-14 NOTE — PROGRESS NOTES
Patient ID: Dio Curran is a 81 y.o. male.  /74   Pulse 61   Wt 171 lb (77.6 kg)   SpO2 98%   BMI 23.85 kg/m      Assessment/Plan:                   Diagnoses and all orders for this visit:    Numbness of right hand  -     EMG- Right Arm; Future; Expected date: 02/01/2021    Encounter for screening for COVID-19  -     Asymptomatic COVID-19 Virus (CORONAVIRUS) PCR; Future; Expected date: 03/01/2021 2019 novel coronavirus disease (COVID-19)    Type 2 diabetes mellitus without complication, without long-term current use of insulin (H)          DISCUSSION  Continue current diabetes management no concerns identified, recommend following up the summer as planned to reassess.    Return for Covid testing prior to travel on approximately March 1, this will be 3 days prior to his departure.  Continue to take steps to prevent gregg the infection again and/or spreading to others.  Obtain Covid vaccine when able.    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  If symptoms worsen contact me for advice.  Based on EMG testing will decide on further course of action which might include referral.  Subjective:     HPI    Dio Curran is a 81 y.o. male is here today to discuss several concerns.  His medical history includes type 2 diabetes.  We reviewed recent labs noting he is doing quite well in this regard.  He had COVID-19 back November and did require hospitalization and the transitional care unit stay.  He reports he has recovered and is basically back to her normal state.  He plans to do some traveling in March and we discussed this specifically.  He will be traveling to [place] and he needs specific documentation that he had a test within 3 days in order to travel.  We did discuss and evaluate that if he were traveling within 90 days of the time of his diagnosis he would not need any additional testing but he will be over 90 days at that point in time.   We reviewed the attestation statement from the airline and discussed this briefly.  There are no signs or symptoms of active infection at this time.  He had requested a follow-up test which was performed.  He had negative antibodies we discussed that the significance of this is uncertain.  Patient is encouraged to continue to remain isolated to prevent any chance of gregg the infection again or potentially spreading to others.    He brought up that he is having numbness in the thumb and second finger on the right hand.  This is a persistent symptom that has been present for a few months.  He states he does not have any strength loss and he is not dropping anything but does have difficulty being able to pick things up, operate a zipper or button clothing because of the numbness.  Discussed further evaluation.  He denies any other areas of numbness or tingling.    Review of Systems  Complete review of systems is obtained.  Other than the specific considerations noted above complete review of systems is negative.          Objective:   Medications:  Current Outpatient Medications   Medication Sig     aspirin 81 MG EC tablet Take 81 mg by mouth daily.      atenoloL (TENORMIN) 25 MG tablet Take 1 tablet (25 mg total) by mouth daily.     blood glucose test strips Accu-Chek Nuris  -Use to check blood sugars three times a day. Follow manufactures directions for use.Type II or unspecified type diabetes mellitus without mention of complication, not stated as uncontrolled  E11.9     blood-glucose meter Misc Use daily with test strips to check blood sugars.     calcium, as carbonate, (OS-RUBEN) 500 mg calcium (1,250 mg) tablet Take 1 tablet by mouth daily.     cholecalciferol, vitamin D3, (VITAMIN D3) 1,000 unit capsule Take 1,000 Units by mouth daily.     co-enzyme Q-10 50 mg capsule Take 50 mg by mouth daily.     fish oil-omega-3 fatty acids (FISH OIL) 300-1,000 mg capsule Take 2 g by mouth daily.     generic lancets  (ACCU-CHEK SOFTCLIX LANCETS) USE AS DIRECTED  TO TEST BLOOD GLUCOSE AS NEEDED     glipiZIDE (GLUCOTROL) 10 MG tablet Take 1 tablet by mouth twice daily     glucosamine-chondroitin 500-400 mg cap Take 2 capsules by mouth daily.     metFORMIN (GLUCOPHAGE) 1000 MG tablet Take 1 tablet (1,000 mg total) by mouth 2 (two) times a day with meals.     multivitamin therapeutic tablet Take 1 tablet by mouth daily.     ONETOUCH ULTRA BLUE TEST STRIP strips USE 1 EACH AS DIRECTED 3 (THREE) TIMES A DAY.     saw palmetto fruit 450 mg cap Take 2 capsules by mouth daily.     sildenafil (VIAGRA) 100 MG tablet Take 1 tablet (100 mg total) by mouth as needed for erectile dysfunction.     simvastatin (ZOCOR) 40 MG tablet Take 1 tablet daily at bedtime.     tamsulosin (FLOMAX) 0.4 mg cap Take 1 capsule (0.4 mg total) by mouth daily after supper.     apixaban ANTICOAGULANT (ELIQUIS) 2.5 mg Tab tablet Take 1 tablet (2.5 mg total) by mouth 2 (two) times a day for 26 days.       Allergies:  Allergies   Allergen Reactions     Levofloxacin Swelling     Throat swelling     Dextrose      Piperazine Anhydrous [Piperazine]        Tobacco:   reports that he has quit smoking. His smoking use included cigarettes and cigars. He has never used smokeless tobacco.     Physical Exam      /74   Pulse 61   Wt 171 lb (77.6 kg)   SpO2 98%   BMI 23.85 kg/m        General: Patient alert no signs of distress    Examination of his hand reveals no muscle atrophy good range of motion good strength in the fingers.  Subjective loss of sensation to light touch of the tips of the fingers only.  Good range of motion of the elbow and wrist.  No significant pain on palpation around the wrist or elbow region.

## 2021-06-14 NOTE — TELEPHONE ENCOUNTER
Spoke with don  appt made for next Monday  Will print out a letter and review labs at time of visit.

## 2021-06-14 NOTE — TELEPHONE ENCOUNTER
No need for an appointment with me.  We can arrange for a Covid test as he requested along with other labs as long as he is asymptomatic.

## 2021-06-14 NOTE — TELEPHONE ENCOUNTER
Bobby this patient would like you to call him in regards to a letter he needs for [place] and a telephone visit with Dr. Lewis regarding neuropathy.     Phone number: [phone]

## 2021-06-14 NOTE — TELEPHONE ENCOUNTER
Would also like a covid antibodies test when he has his blood drawn. Can he get that also? I can pend for you.  He had covid on 11/4/20. He says thank you to Dr Lewis and Bobby for helping him get this all done before his [place] trip.

## 2021-06-15 NOTE — TELEPHONE ENCOUNTER
Spoke with patient regarding test results.  No need for further testing.  Patient will drop off forms and I will complete them.

## 2021-06-15 NOTE — TELEPHONE ENCOUNTER
Try the acupuncturist.  If needed we can always get you into see [facility] but I think finding a way to manage such as with acupuncture would be a good thing to try.

## 2021-06-15 NOTE — TELEPHONE ENCOUNTER
Reason for Call:  Request for results:    Name of test or procedure: COVID vaccine results    Date of test of procedure: 03.01.2021    Location of the test or procedure: [facility] lab    OK to leave the result message on voice mail or with a family member? Yes    Phone number Patient can be reached at: Home number on file [phone] (home)    Additional comments: pt/Dio would also like to go over forms for travel insurance due to him having to cancel his trip.    Since November he has been tested and the tests have been going back and forth from positive to negative. Dio would like to know if we can have a standing order for the COVID test     Call taken on 3/2/2021 at 1:45 PM by Gris Bolaños

## 2021-06-15 NOTE — TELEPHONE ENCOUNTER
Received a phone call from patient stating he will be seeing a acupuncturist but wanted know if he is better off going to [facility]. Please advise he wanted you to give him a call in regards to this.

## 2021-06-15 NOTE — PROGRESS NOTES
Patient ID: Dio Curran is a 81 y.o. male.  Temp 96.7  F (35.9  C)   Wt 171 lb (77.6 kg)   BMI 23.85 kg/m      Assessment/Plan:                   Diagnoses and all orders for this visit:    Encounter for laboratory testing for COVID-19 virus  -     Asymptomatic COVID-19 Virus (CORONAVIRUS) PCR; Future; Expected date: 03/01/2021    History of 2019 novel coronavirus disease (COVID-19)           DISCUSSION  High suspicion for false positive test result.  Discussed retesting, if negative would be reasonable to travel.  Discussed with patient risks of subsequent positive test results upon return home which could complicate his return he understands this.  We will await the test result from today and then decide on further course of action.  Do feel it is pertinent to retest even though he had a test done just yesterday.  Subjective:     HPI    Dio Curran is a 81 y.o. male who is here today to discuss testing for COVID-19.  His medical history includes diabetes, hypertension, dyslipidemia.    November 4, 2020 he was diagnosed with COVID-19 and had positive testing.  He had symptoms that necessitated hospitalization and post hospital transitional care.  He has recovered and has exhibited no signs or symptoms in the past 2 months.    At this point in time he received the first of his 2 Covid vaccinations.    Due to planned travel on March 3, 2021 patient underwent a routine Covid screening test.  He plans to travel to [place] and this was required for travel to the country and would be required again upon return home.    Unexpectedly his test done February 28, 2021 returned positive.  Patient exhibits no signs or symptoms of infection at this point in time.  He has not had any known exposure to any person with COVID-19 in the recent past.  Today we discussed considerations related to his positive test result and to travel.    Review of Systems  Complete review of systems is obtained.  Other than the specific  considerations noted above complete review of systems is negative.          Objective:   Medications:  Current Outpatient Medications   Medication Sig     aspirin 81 MG EC tablet Take 81 mg by mouth daily.      atenoloL (TENORMIN) 25 MG tablet Take 1 tablet (25 mg total) by mouth daily.     blood glucose test strips Accu-Chek Nuris  -Use to check blood sugars three times a day. Follow manufactures directions for use.Type II or unspecified type diabetes mellitus without mention of complication, not stated as uncontrolled  E11.9     blood-glucose meter Misc Use daily with test strips to check blood sugars.     calcium, as carbonate, (OS-RUBEN) 500 mg calcium (1,250 mg) tablet Take 1 tablet by mouth daily.     cholecalciferol, vitamin D3, (VITAMIN D3) 1,000 unit capsule Take 1,000 Units by mouth daily.     co-enzyme Q-10 50 mg capsule Take 50 mg by mouth daily.     fish oil-omega-3 fatty acids (FISH OIL) 300-1,000 mg capsule Take 2 g by mouth daily.     generic lancets (ACCU-CHEK SOFTCLIX LANCETS) USE AS DIRECTED  TO TEST BLOOD GLUCOSE AS NEEDED     glipiZIDE (GLUCOTROL) 10 MG tablet Take 1 tablet by mouth twice daily     glucosamine-chondroitin 500-400 mg cap Take 2 capsules by mouth daily.     metFORMIN (GLUCOPHAGE) 1000 MG tablet Take 1 tablet (1,000 mg total) by mouth 2 (two) times a day with meals.     multivitamin therapeutic tablet Take 1 tablet by mouth daily.     ONETOUCH ULTRA BLUE TEST STRIP strips USE 1 EACH AS DIRECTED 3 (THREE) TIMES A DAY.     saw palmetto fruit 450 mg cap Take 2 capsules by mouth daily.     sildenafil (VIAGRA) 100 MG tablet Take 1 tablet (100 mg total) by mouth as needed for erectile dysfunction.     simvastatin (ZOCOR) 40 MG tablet Take 1 tablet daily at bedtime.     tamsulosin (FLOMAX) 0.4 mg cap Take 1 capsule (0.4 mg total) by mouth daily after supper.     apixaban ANTICOAGULANT (ELIQUIS) 2.5 mg Tab tablet Take 1 tablet (2.5 mg total) by mouth 2 (two) times a day for 26 days.        Allergies:  Allergies   Allergen Reactions     Levofloxacin Swelling     Throat swelling     Dextrose      Piperazine Anhydrous [Piperazine]        Tobacco:   reports that he has quit smoking. His smoking use included cigarettes and cigars. He has never used smokeless tobacco.     Physical Exam          Temp 96.7  F (35.9  C)   Wt 171 lb (77.6 kg)   BMI 23.85 kg/m              General Appearance:    Alert, cooperative, no distress   Eyes:   No scleral icterus or conjunctival irritation       Extremities:  No edema, no joint swelling or redness, no evidence of any injuries   Skin:  No concerning skin findings, no suspicious moles, no rashes   Neurologic:  On gross examination there is no motor or sensory deficit.  Patient walks with a normal gait

## 2021-06-15 NOTE — TELEPHONE ENCOUNTER
Patient calling to check the status of this form   Please notify him once it is ready for pick-up    Phone number: [phone]

## 2021-06-16 PROBLEM — Z71.89 ADVANCE CARE PLANNING: Status: ACTIVE | Noted: 2020-11-22

## 2021-06-16 PROBLEM — Z86.0100 HISTORY OF COLONIC POLYPS: Status: ACTIVE | Noted: 2019-04-17

## 2021-06-16 PROBLEM — U07.1 2019 NOVEL CORONAVIRUS DISEASE (COVID-19): Status: ACTIVE | Noted: 2020-11-21

## 2021-06-16 NOTE — PROGRESS NOTES
[facility]  [address]  [facility] [address]  Dept: [phone]  Dept Fax: [phone]  Primary Provider: Rajesh Lewis MD  Pre-op Performing Provider: KATIE BERG    PREOPERATIVE EVALUATION:  Today's date: 4/12/2021    Dio Curran is a 81 y.o. male who presents for a preoperative evaluation.    Surgical Information:  Surgery/Procedure: carpal tunnel surgery   Surgery Location: [facility]  Surgeon: dr mo cheek  Surgery Date: 4/13/21  Time of Surgery: 1:30 pm  Where patient plans to recover: At home with family  Fax number for surgical facility: [phone]    Type of Anesthesia Anticipated: Block    Assessment & Plan      81-year-old male with past medical history of type 2 diabetes and hypertension who presents for preoperative examination of carpal tunnel release on right.  Patient's chronic diseases are well controlled.  Exam today unremarkable.  Recommended routine testing with EKG, CMP, CBC given his past medical history.  EKG reviewed by myself today in clinic and normal.  Will await CBC and CMP results    1. Preop general physical exam  - Electrocardiogram Perform and Read  - Comprehensive Metabolic Panel  - HM2(CBC w/o Differential)    2. Type 2 diabetes mellitus without complication, without long-term current use of insulin (H)  - Electrocardiogram Perform and Read    3. Hypertension, unspecified type  - Electrocardiogram Perform and Read    The proposed surgical procedure is considered LOW risk.    Medication Instructions:  Hold all chronic medications and supplements except for atenolol day of surgery    RECOMMENDATION:  APPROVAL GIVEN to proceed with proposed procedure pending review of diagnostic evaluation.    Subjective     HPI related to upcoming procedure: Patient thinks maybe he has had numbness and weakness in his right hand specifically in his thumb and 2nd and 3rd fingers.     Preop Questions 4/12/2021   Have you ever had  a heart attack or stroke? No   Have you ever had surgery on your heart or blood vessels, such as a stent placement, a coronary artery bypass, or surgery on an artery in your head, neck, heart, or legs? No   Do you have chest pain with activity? No   Do you have a history of  heart failure? No   Do you currently have a cold, bronchitis or symptoms of other infection? No   Do you have a cough, shortness of breath, or wheezing? No   Do you or anyone in your family have previous history of blood clots? No   Do you or does anyone in your family have a serious bleeding problem such as prolonged bleeding following surgeries or cuts? No   Have you ever had problems with anemia or been told to take iron pills? No   Have you had any abnormal blood loss such as black, tarry or bloody stools? No   Have you ever had a blood transfusion? No   Are you willing to have a blood transfusion if it is medically needed before, during, or after your surgery? Yes   Have you or any of your relatives ever had problems with anesthesia? No   Do you have sleep apnea, excessive snoring or daytime drowsiness? No   Do you have any artifical heart valves or other implanted medical devices like a pacemaker, defibrillator, or continuous glucose monitor? No   Do you have artificial joints? No   Are you allergic to latex? No     Health Care Directive:  Patient has a Health Care Directive on file.     Preoperative Review of :    reviewed - no record of controlled substances prescribed.    DIABETES - Patient has a longstanding history of DiabetesType Type II . Patient is being treated with oral agents and denies significant side effects. Control has been good. Complicating factors include but are not limited to: none known.     HYPERTENSION - Patient has longstanding history of HTN , currently denies any symptoms referable to elevated blood pressure. Specifically denies chest pain, palpitations, dyspnea, orthopnea, PND or peripheral edema. Blood  pressure readings have been in normal range. Current medication regimen is as listed below. Patient denies any side effects of medication.     B/P 4/12/2021 3/22/2021 2/1/2021 11/27/2020 11/25/2020   Systolic 128 126 128 118 133   Diastolic 70 62 74 60 68     B/P 11/20/2020 11/18/2020 11/14/2020 11/14/2020 11/13/2020   Systolic 138 132 136 124 113   Diastolic 76 75 69 72 69     B/P 11/13/2020 11/13/2020   Systolic 115 124   Diastolic 59 65       Review of Systems  Complete ROS is negative except as noted in HPI    Patient Active Problem List    Diagnosis Date Noted     Physical deconditioning 12/01/2020     Advance care planning 11/22/2020     Protein-calorie malnutrition, severe (H) 11/21/2020 2019 novel coronavirus disease (COVID-19) 11/21/2020     Pneumonia due to COVID-19 virus 11/11/2020     History of colonic polyps 04/17/2019     Anorectal disorder 04/15/2019     Fatigue      Osteopenia      Decrease In Height      Hyperlipidemia LDL goal <100      Hypertension      Hyperkalemia      Type 2 Diabetes Mellitus      Urinary Frequency      Benign Adenoma Of The Large Intestine      No past medical history on file.  No past surgical history on file.  Current Outpatient Medications   Medication Sig Dispense Refill     apixaban ANTICOAGULANT (ELIQUIS) 2.5 mg Tab tablet Take 1 tablet (2.5 mg total) by mouth 2 (two) times a day for 26 days.  0     aspirin 81 MG EC tablet Take 81 mg by mouth daily.        atenoloL (TENORMIN) 25 MG tablet Take 1 tablet (25 mg total) by mouth daily. 90 tablet 3     blood glucose test strips Accu-Chek Nuris  -Use to check blood sugars three times a day. Follow manufactures directions for use.Type II or unspecified type diabetes mellitus without mention of complication, not stated as uncontrolled  E11.9 300 strip 3     blood-glucose meter Misc Use daily with test strips to check blood sugars. 1 each 0     calcium, as carbonate, (OS-RUBEN) 500 mg calcium (1,250 mg) tablet Take 1 tablet by  mouth daily.       cholecalciferol, vitamin D3, (VITAMIN D3) 1,000 unit capsule Take 1,000 Units by mouth daily.       co-enzyme Q-10 50 mg capsule Take 50 mg by mouth daily.       fish oil-omega-3 fatty acids (FISH OIL) 300-1,000 mg capsule Take 2 g by mouth daily.       generic lancets (ACCU-CHEK SOFTCLIX LANCETS) USE AS DIRECTED  TO TEST BLOOD GLUCOSE AS NEEDED 300 each 3     glipiZIDE (GLUCOTROL) 10 MG tablet Take 1 tablet by mouth twice daily 180 tablet 3     glucosamine-chondroitin 500-400 mg cap Take 2 capsules by mouth daily.       metFORMIN (GLUCOPHAGE) 1000 MG tablet Take 1 tablet (1,000 mg total) by mouth 2 (two) times a day with meals. 180 tablet 3     multivitamin therapeutic tablet Take 1 tablet by mouth daily.       ONETOUCH ULTRA BLUE TEST STRIP strips USE 1 EACH AS DIRECTED 3 (THREE) TIMES A DAY. 300 strip 3     saw palmetto fruit 450 mg cap Take 2 capsules by mouth daily.       sildenafil (VIAGRA) 100 MG tablet Take 1 tablet (100 mg total) by mouth as needed for erectile dysfunction. 30 tablet 6     simvastatin (ZOCOR) 40 MG tablet Take 1 tablet daily at bedtime. 90 tablet 3     tamsulosin (FLOMAX) 0.4 mg cap Take 1 capsule (0.4 mg total) by mouth daily after supper. 30 capsule 0     No current facility-administered medications for this visit.        Allergies   Allergen Reactions     Levofloxacin Swelling     Throat swelling     Dextrose      Piperazine Anhydrous [Piperazine]        Social History     Tobacco Use     Smoking status: Former Smoker     Types: Cigarettes, Cigars     Smokeless tobacco: Never Used     Tobacco comment: cigars once in a while   Substance Use Topics     Alcohol use: Yes     Frequency: Monthly or less      No family history on file.  Social History     Substance and Sexual Activity   Drug Use Not Currently        Objective   Physical Exam  /70 (Patient Site: Right Arm, Patient Position: Sitting, Cuff Size: Adult Large)   Pulse (!) 59   Temp 96.7  F (35.9  C)  "(Temporal)   Ht 5' 10\" (1.778 m)   Wt 179 lb 6.4 oz (81.4 kg)   SpO2 96%   BMI 25.74 kg/m      General appearance: Alert, cooperative, no distress, appears stated age  Head: Normocephalic, atraumatic, without obvious abnormality  Eyes: Pupils equal round, reactive.  Conjunctiva clear.  Nose: Nares normal, no drainage.  Throat: Lips, mucosa, tongue normal mucosa pink and moist  Neck: Supple, symmetric, trachea midline, no adenopathy.  No thyroid enlargement, tenderness or nodules.    Back: Symmetric, no CVA tenderness.  Lungs: Clear to auscultation bilaterally, no wheezing or crackles present.  Respirations unlabored  Heart: Regular rate and rhythm, normal S1 and S2, no murmur, rub or gallop.  Abdomen: Soft, nontender, nondistended.  Bowel sounds active in all 4 quadrants.  No masses or organomegaly.  Extremities: Extremities normal, atraumatic.  No cyanosis or edema.  Skin: Skin color, texture, turgor normal no rashes or lesions on limited skin exam  Neurologic: CN II through XII intact, normal strength.    Recent Labs   Lab Test 01/27/21  0926 11/14/20  0627 11/13/20  0821   HGB 15.4 11.9* 12.1*    339 343   INR  --  1.18* 1.13*    137 136   K 4.8 4.2 4.1   CREATININE 1.03 0.93 0.96        PRE-OP Diagnostics:   Labs pending at this time. Results will be reviewed when available.  EKG required for HTN, Diabetes, and age > 65 and not completed in the last 90 days.    REVISED CARDIAC RISK INDEX (RCRI)   The patient has the following serious cardiovascular risks for perioperative complications:   - No serious cardiac risks = 0 points    RCRI INTERPRETATION: 0 points: Class I (very low risk - 0.4% complication rate)    Signed Electronically by: Federico Crane MD            "

## 2021-06-16 NOTE — PROGRESS NOTES
Please call patient: The EMG test he had done of his right arm shows that he has compression of 2 nerves in the arm causing his symptoms.  One nerve affected is the median nerve which is carpal tunnel syndrome, the other nerve is the ulnar nerve.  I will refer him to orthopedic specialist at [facility] to help decide on treatment which could include cortisone injection, therapy, surgery or a combination of these.  If there is any question let me know please provide patient with a number to schedule his own appointment at [facility].

## 2021-06-16 NOTE — PROGRESS NOTES
Patient presents at the request of Dr. Rajesh Lewis for right upper extremity EMG.  He has a 3-month history of constant numbness and tingling right digits 1 through 3.  He is right-handed.  He has weakness of his hand and atrophy of the 80 APB and FDI.  No cervical spine pain.  History of diabetes mellitus.  On exam, he has weakness of the interosseous muscles of the right hand.  Normal sensation to light touch and negative Josie's.    EMG/NCS  results: Please see scanned full report.    Comment NCS: Abnormal study:    1.  Absent right median and ulnar SNAPs  2.  Absent right median transcarpal study.  3.  Low amplitude bilateral radial SNAPs  4.  Absent right median CMAP  5.  Diffuse low amplitude right ulnar CMAP with conduction velocity slowing across the elbow.    Comment EMG: Abnormal study.  1.  Increased insertional activity with denervation potentials of the right first dorsal interosseous and APB.  Reduced recruitment of the FDI, APB and ADM.  Remainder right upper extremity needle EMG normal    Interpretation: Abnormal study: There is electrodiagnostic evidence of:    1.  Right median neuropathy at the wrist consistent with entrapment in the carpal tunnel, severe.    2.  Right ulnar neuropathy likely localizing to the elbow with denervation of the  intrinsic muscles of the hand.    3.  Diffuse low amplitude or absent bilateral upper extremity sensory nerve conduction studies.  These findings are consistent with a sensory or sensorimotor peripheral polyneuropathy.    4.  A right C8 and/or T1 radiculopathy versus lower trunk brachial plexopathy cannot be completely excluded although clinically, this is less likely given no cervical spine pain.    Note: Given the severity of the findings, would recommend surgical evaluation for the carpal tunnel syndrome and ulnar neuropathy.  If clinically indicated, MRI of the cervical spine could be done to evaluate for concomitant cervical nerve root compression  contributing to his symptoms.  This could also be considered if no improvement after carpal tunnel release.    The testing was completed in its entirety by the physician.      It was our pleasure caring for your patient today, if there any questions or concerns please do not hesitate to contact us.

## 2021-06-16 NOTE — PATIENT INSTRUCTIONS - HE
Thank you for choosing the [facility] for your EMG testing.    The ordering provider will receive your final EMG results within the next few days.  Please follow up with your provider for the results and further treatment recommendations.

## 2021-06-16 NOTE — PROGRESS NOTES
Please fax these results and note to number in note for pre-op    Antolin  Your results from your recent clinic visit show:  Your EKG was normal  Your CMP was mostly normal but did show your blood sugar was up to 232 and your potassium was elevated at 5.4.  Both these are mild elevations though given your history.  Your CBC is normal with no anemia or signs of infection seen  You are approved to proceed with your surgery today.    If you have more questions please send me a MyChart message saying you saw me or call the clinic    Dr. Federico Luke

## 2021-06-16 NOTE — TELEPHONE ENCOUNTER
----- Message from Federico Crane MD sent at 4/13/2021  7:11 AM CDT -----  Please fax these results and note to number in note for pre-op    Antolin  Your results from your recent clinic visit show:  Your EKG was normal  Your CMP was mostly normal but did show your blood sugar was up to 232 and your potassium was elevated at 5.4.  Both these are mild elevations though given your history.  Your CBC is normal with no anemia or signs of infection seen  You are approved to proceed with your surgery today.    If you have more questions please send me a MyChart message saying you saw me or call the clinic    Dr. Federico Crane

## 2021-06-17 NOTE — PATIENT INSTRUCTIONS - HE
Patient Instructions by Rajesh Lewis MD at 6/24/2019  8:40 AM     Author: Rajesh Lewis MD Service: -- Author Type: Physician    Filed: 6/24/2019  9:53 AM Encounter Date: 6/24/2019 Status: Signed    : Rajesh Lewis MD (Physician)         Patient Education     Exercise for a Healthier Heart  You may wonder how you can improve the health of your heart. If youre thinking about exercise, youre on the right track. You dont need to become an athlete, but you do need a certain amount of brisk exercise to help strengthen your heart. If you have been diagnosed with a heart condition, your doctor may recommend exercise to help stabilize your condition. To help make exercise a habit, choose safe, fun activities.       Be sure to check with your health care provider before starting an exercise program.    Why exercise?  Exercising regularly offers many healthy rewards. It can help you do all of the following:    Improve your blood cholesterol levels to help prevent further heart trouble    Lower your blood pressure to help prevent a stroke or heart attack    Control diabetes, or reduce your risk of getting this disease    Improve your heart and lung function    Reach and maintain a healthy weight    Make your muscles stronger and more limber so you can stay active    Prevent falls and fractures by slowing the loss of bone mass (osteoporosis)    Manage stress better  Exercise tips  Ease into your routine. Set small goals. Then build on them.  Exercise on most days. Aim for a total of 150 or more minutes of moderate to  vigorous intensity activity each week. Consider 40 minutes, 3 to 4 times a week. For best results, activity should last for 40 minutes on average. It is OK to work up to the 40 minute period over time. Examples of moderate-intensity activity is walking one mile in 15 minutes or 30 to 45 minutes of yard work.  Step up your daily activity level. Along with your exercise program, try being more  active throughout the day. Walk instead of drive. Do more household tasks or yard work.  Choose one or more activities you enjoy. Walking is one of the easiest things you can do. You can also try swimming, riding a bike, or taking an exercise class.  Stop exercising and call your doctor if you:    Have chest pain or feel dizzy or lightheaded    Feel burning, tightness, pressure, or heaviness in your chest, neck, shoulders, back, or arms    Have unusual shortness of breath    Have increased joint or muscle pain    Have palpitations or an irregular heartbeat      2682-2994 [employer]. [address] [place] [address]. All rights reserved. This information is not intended as a substitute for professional medical care. Always follow your healthcare professional's instructions.           Advance Directive  Patients advance directive was discussed and I am comfortable with the patients wishes.  Patient Education   Personalized Prevention Plan  You are due for the preventive services outlined below.  Your care team is available to assist you in scheduling these services.  If you have already completed any of these items, please share that information with your care team to update in your medical record.  Health Maintenance   Topic Date Due   ? ZOSTER VACCINES (2 of 3) 12/19/2007   ? DIABETES FOOT EXAM  06/19/2019   ? INFLUENZA VACCINE RULE BASED (Season Ended) 08/01/2019   ? DIABETES HEMOGLOBIN A1C  12/24/2019   ? DIABETES FOLLOW-UP  12/24/2019   ? DIABETES OPHTHALMOLOGY EXAM  04/22/2020   ? DIABETES URINE MICROALBUMIN  06/24/2020   ? FALL RISK ASSESSMENT  06/24/2020   ? TD 18+ HE  02/25/2021   ? ADVANCE DIRECTIVES DISCUSSED WITH PATIENT  06/19/2023   ? PNEUMOCOCCAL POLYSACCHARIDE VACCINE AGE 65 AND OVER  Completed   ? PNEUMOCOCCAL CONJUGATE VACCINE FOR ADULTS (PCV13 OR PREVNAR)  Completed

## 2021-06-18 NOTE — PROGRESS NOTES
Assessment and Plan:     Dio was seen today for annual wellness visit and diabetes.    Diabetes mellitus, type 2 (H)  -     Glycosylated Hemoglobin A1c; Future  -     Microalbumin, Random Urine  -     Glycosylated Hemoglobin A1c    Hypertension    Hyperlipidemia LDL goal <100    Hyperkalemia    Routine general medical examination at a health care facility      The patient's current medical problems were reviewed.    The following high BMI interventions were performed this visit: encouragement to exercise  The following health maintenance schedule was reviewed with the patient and provided in printed form in the after visit summary:   Health Maintenance   Topic Date Due     DIABETES OPHTHALMOLOGY EXAM  01/02/1950     DIABETES URINE MICROALBUMIN  06/09/2018     INFLUENZA VACCINE RULE BASED (Season Ended) 08/01/2018     DIABETES HEMOGLOBIN A1C  12/19/2018     DIABETES FOLLOW-UP  12/19/2018     DIABETES FOOT EXAM  06/19/2019     FALL RISK ASSESSMENT  06/19/2019     TD 18+ HE  02/25/2021     ADVANCE DIRECTIVES DISCUSSED WITH PATIENT  06/09/2022     PNEUMOCOCCAL POLYSACCHARIDE VACCINE AGE 65 AND OVER  Completed     PNEUMOCOCCAL CONJUGATE VACCINE FOR ADULTS (PCV13 OR PREVNAR)  Completed     ZOSTER VACCINE  Completed        Subjective:   Chief Complaint: Dio Curran is an 78 y.o. male here for an Annual Wellness visit.   HPI: He has been  for 3 years we discussed this today.  Overall seems to be doing well.  He has type 2 diabetes on metformin and glipizide.  Recent A1c is just over 7.  His A1c today shows improvement he does report some variable blood sugars probably from timing of administration of metformin and glipizide doses we spent some time today discussing the importance of consistent administration for meals in the morning and again in the evening.  He is going to change this and hopefully this will eliminate some of the variation he has seen.  No other significant complications of his diabetes.   Reviewed recent eye exam from January which shows no retinopathy.  No neuropathy symptoms normal foot exam today.  Most recent urine test showed no microalbuminuria.  Has normal kidney function no history of vascular disease.  Does tend to have hyperkalemia probably as a manifestation of blood draws and not true hyperkalemia.  We will continue to monitor all concerns closely.  Reviewed blood pressure numbers and cholesterol test results.  Discussed other routine health preventive measures and his current medications are reviewed and discussed in depth.    Review of Systems:Please see above.  The rest of the review of systems are negative for all systems.    Patient Care Team:  Rajesh Lewis MD as PCP - General     Patient Active Problem List   Diagnosis     Fatigue     Osteopenia     Decrease In Height     Hyperlipidemia     Hypertension     Hyperkalemia     Type 2 Diabetes Mellitus     Urinary Frequency     Benign Adenoma Of The Large Intestine     No past medical history on file.   No past surgical history on file.   No family history on file.   Social History     Social History     Marital status:      Spouse name: N/A     Number of children: N/A     Years of education: N/A     Occupational History     Not on file.     Social History Main Topics     Smoking status: Former Smoker     Types: Cigarettes, Cigars     Smokeless tobacco: Never Used      Comment: cigars once in a while     Alcohol use Not on file     Drug use: Not on file     Sexual activity: Not on file     Other Topics Concern     Not on file     Social History Narrative      Current Outpatient Prescriptions   Medication Sig Dispense Refill     aspirin 81 MG EC tablet Take 81 mg by mouth 2 (two) times a day.       atenolol (TENORMIN) 25 MG tablet TAKE 1 TABLET BY MOUTH DAILY 90 tablet 0     blood glucose test (GLUCOSE BLOOD) strips Use 1 each As Directed 3 (three) times a day. 300 each 3     CALCIUM CARBONATE/VITAMIN D3 (CALCIUM 600 + D,3,  "ORAL) Take 1 tablet by mouth daily.       cholecalciferol, vitamin D3, (VITAMIN D3) 1,000 unit capsule Take 1,000 Units by mouth daily.       cholecalciferol, vitamin D3, (VITAMIN D3) 2,000 unit Tab Take 1 tablet by mouth daily.       CHROMIUM PICOLINATE ORAL Take 1 capsule by mouth 2 (two) times a day.       DOCOSAHEXANOIC ACID/EPA (FISH OIL ORAL) Take 2,000 mg by mouth daily.       glipiZIDE (GLUCOTROL) 10 MG tablet TAKE 1 TABLET (10 MG TOTAL) BY MOUTH 2 (TWO) TIMES A DAY 1/2 HOUR BEFORE MEALS 180 tablet 3     GLUCOSAM HCL/CHONDRO DELATORRE A/C/MN (GLUCOSAMINE-CHONDROITIN COMPLX ORAL) Take 1 capsule by mouth daily. 1500       metFORMIN (GLUCOPHAGE) 1000 MG tablet TAKE ONE TABLET BY MOUTH TWICE DAILY WITH MEALS 180 tablet 1     MULTIVITAMIN ORAL Take 1 tablet by mouth daily.       saw palmetto fruit 450 mg cap Take 2 capsules by mouth daily.       simvastatin (ZOCOR) 40 MG tablet Take 1 tablet (40 mg total) by mouth at bedtime. 90 tablet 0     No current facility-administered medications for this visit.       Objective:   Vital Signs:   Visit Vitals     /80     Pulse (!) 57     Ht 5' 9.13\" (1.756 m)     Wt 185 lb (83.9 kg)     SpO2 93%     BMI 27.22 kg/m2        VisionScreening:  No exam data present     PHYSICAL EXAM        General Appearance:    Alert, cooperative, no distress, appears stated age   Eyes:   No scleral icterus or conjunctival irritation       Ears:    Normal TM's and external ear canals, both ears   Throat:   Lips, mucosa, and tongue normal; teeth and gums normal   Neck:   Supple, symmetrical, trachea midline, no adenopathy;        thyroid:  No enlargement/tenderness/nodules   Lungs:     Clear to auscultation bilaterally, respirations unlabored, no wheezes or crackles   Heart:    Regular rate and rhythm,  no murmur, rub   or gallop   Abdomen:     Soft, non-tender, bowel sounds active,     no masses, no organomegaly     Extremities:   Extremities normal, atraumatic, no cyanosis or edema palpable " bilateral dorsalis pedis pulses   Skin:   Skin color, texture, turgor normal, no rashes or lesions   Neurologic:   Normal strength and sensation        throughout on gross examination.  Including the feet using monofilament line.     Genitalia:    Normal male without lesion, discharge or tenderness   Rectal:    Normal tone, normal prostate, no masses or tenderness;               Assessment Results 6/19/2018   Activities of Daily Living No help needed   Instrumental Activities of Daily Living No help needed   Mini Cog Total Score 5   Some recent data might be hidden     A Mini-Cog score of 0-2 suggests the possibility of dementia, score of 3-5 suggests no dementia    Identified Health Risks:     The patient was counseled and encouraged to consider modifying their diet and eating habits. He was provided with information on recommended healthy diet options.  Patient's advanced directive was discussed and I am comfortable with the patient's wishes.

## 2021-06-21 NOTE — LETTER
"Letter by Kendal Barbosa CNP at      Author: Kendal Barbosa CNP Service: -- Author Type: --    Filed:  Encounter Date: 11/20/2020 Status: (Other)         Patient: Dio Curran   MR Number: [id number]   YOB: 1940   Date of Visit: 11/20/2020     [facility]    Facility:   [facility] [[id number]]   Code Status: UNKNOWN      CHIEF COMPLAINT/REASON FOR VISIT:  Chief Complaint   Patient presents with   ? Review Of Multiple Medical Conditions     Severe protein calorie malnutrition, ACPCOVID-19, status post COVID-19 pneumonia, physical deconditioning       HISTORY:      HPI: Dio is a 80 y.o. male who  has no past medical history on file. He however, has a past medical history including HTN, DMII, HLD, hypomagnesemia, ED. He was recently admitted to the hospital after suffering a fall from weakness, where he was found to have pneumonia related to COVID-19. He was admitted to [facility] from 11/10/2020 to 11/14/2020. The discharging provider summarized the hospitalization as follows:     \"80 y.o. male with a past medical history of diabetes mellitus type 2, hypertension dyslipidemia recently tested positive for covid 19, presented to the emergency room with complaints of multiple falls who was admitted on 11/10/2020 for generalized weakness, chest x-ray showed worsening infiltrates, consistent with COVID 19 pneumonia vs bacterial pneumonia. started on remdesivir, dexamethasone, antibiotics. developed urinary retention, mcdonnell catheter was placed.  Started Flomax. Void trial in 3-4 days. He is discharged to TCU.\"    Today Navneet is being evaluated for a routine review of multiple medical problems while in transitional care unit.  He states that he has been feeling really well.  He states that he is still very shocked at how weak he is compared to his prior state.  He states that COVID-19 really took a lot away from him.  He is looking forward to getting stronger and " going home.  He has been engaged in physical and occupational therapies.  He states he has not had any pains or aches.  He states that he has been eating, drinking and eliminating well.  Nursing staff believe that he has been doing great.  Nursing staff to request advance care planning and POLST discussion.  Navneet denies any other concerns including fevers/chills, cough or cold symptoms, headaches, vision changes, chest pain/pressure, difficulty breathing, SOB, abdominal pain, nausea, vomiting, diarrhea, dysuria, increasing weakness, increasing pain.    Advanced Care Planning  Spoke with Navneet regarding code status and advanced care planning.  Navneet consented to discussion and is aware of possible copay. They are also aware of the necessity of this discussion due to TCU admission. Discussed that he would like to have full resuscitation efforts. he would also like to have treatment if he  were to fall ill. he would like full medical treatment for all medical issues.  All of his children would decide for him if he  was unable to make medical decisions.  His children's names are Johnnie Curran and Denice Ravi.  He states that Johnnie Curran is his power of .  He does have legal papers signifying this.  Dio agrees to IV/IM antibiotics. he  is not quite sure about a feeding tube, he states that it is worth a discussion if the chance ever comes across.. There are no Catholic obligations he  would like documented at this time.  He does want a documented that he is [employer].  He does agree to organ donation upon his death.    No past medical history on file.          No family history on file.  Social History     Socioeconomic History   ? Marital status:      Spouse name: Not on file   ? Number of children: Not on file   ? Years of education: Not on file   ? Highest education level: Not on file   Occupational History   ? Not on file   Social Needs   ? Financial resource strain: Not on file   ? Food  insecurity     Worry: Not on file     Inability: Not on file   ? Transportation needs     Medical: Not on file     Non-medical: Not on file   Tobacco Use   ? Smoking status: Former Smoker     Types: Cigarettes, Cigars   ? Smokeless tobacco: Never Used   ? Tobacco comment: cigars once in a while   Substance and Sexual Activity   ? Alcohol use: Yes     Frequency: Monthly or less   ? Drug use: Not Currently   ? Sexual activity: Not on file   Lifestyle   ? Physical activity     Days per week: Not on file     Minutes per session: Not on file   ? Stress: Not on file   Relationships   ? Social connections     Talks on phone: Not on file     Gets together: Not on file     Attends Gnosticism service: Not on file     Active member of club or organization: Not on file     Attends meetings of clubs or organizations: Not on file     Relationship status: Not on file   ? Intimate partner violence     Fear of current or ex partner: Not on file     Emotionally abused: Not on file     Physically abused: Not on file     Forced sexual activity: Not on file   Other Topics Concern   ? Not on file   Social History Narrative   ? Not on file       REVIEW OF SYSTEM:  Pertinent items are noted in HPI.    PHYSICAL EXAM:   /76   Pulse 77   Temp 98  F (36.7  C)   Resp 16   Wt 162 lb 3.2 oz (73.6 kg)   SpO2 94%   BMI 22.62 kg/m      A limited exam was performed due to recommendations for care during COVID-19 pandemic. Due to the 2020 COVID-19 pandemic, except as noted above, the patient was visually observed at a 6 foot plus distance.  An observational exam was performed in an effort to keep patient safe from COVID-19 and other communicable diseases.     General appearance: alert, appears stated age and cooperative  HEENT: Head is normocephalic with normal hair distribution. No evidence of trauma. Ears: Without lesions or deformity. No acute purulent discharge. Eyes: Conjunctivae pink with no scleral icterus or erythema. Nose: Normal.  Oropharnyx: mmm.  Lungs: respirations without effort.  Extremities: extremities normal, atraumatic, no cyanosis.  Skin: Skin color, texture normal. No rashes or lesions on exposed skin.   Neurologic: Grossly normal   Psych: interacts well with caregivers, exhibits logical thought processes and connections, pleasant.      LABS:   None today.     ASSESSMENT:      ICD-10-CM    1. 2019 novel coronavirus disease (COVID-19)  U07.1    2. Pneumonia due to COVID-19 virus  U07.1     J12.89    3. Advance care planning  Z71.89    4. Protein-calorie malnutrition, severe (H)  E43        PLAN:    Care plan reviewed and remains appropriate.    COVID-19 Infection, Pneumonia due to COVID-19  -COVID-19 PCR positive.   -Albuterol 2 puffs with chamber every 4 hours as needed for shortness of breath or wheeze.   -Tylenol 650 mg by mouth four times a day for fever, pain as needed.   -Vitamin D3 5000 iu by mouth daily.   -Quarantine.   -Follow carefully.     Physical Deconditioning  -Continue PT/OT and other therapies as per care plan.  -Encouraged good nutrition and movement habits.   -Discussed care plan and expected course of stay.   -Continue to follow-up per routine schedule or sooner if needed.     Severe Protein Calorie Malnutrition  -Eval and treat by dietician.   -Supplementation by Ensure 8 ounces two times a day.   -Follow up as needed.     Advanced Care Planning  -POLST reviewed and signed.   -DNR/DNI. Full Code.   -An additional 16 minutes of time was spent discussing code status, wishes for end of life care and reviewing POLST from 1305 to 1321. POLST signed and left with nursing staff. .    Admission history and physical per MD in the next 30 days. At this time continue current care plan for all chronic medical conditions, as they are stable. Encouraged patient to engage in PT/OT for strengthening and conditioning. Encouraged patient to work closely with nursing staff to ensure any medical complaints are quickly addressed.  Follow up this week or sooner if needed. Will continue to monitor patient and work with nursing staff collaboratively to work toward positive patient outcomes.    Electronically signed by: Kendal Barbosa CNP

## 2021-06-21 NOTE — LETTER
Letter by Kristie Andrew MD at      Author: Kristie Andrew MD Service: -- Author Type: --    Filed:  Encounter Date: 11/26/2020 Status: (Other)         Patient: Dio Curran   MR Number: [id number]   YOB: 1940   Date of Visit: 11/26/2020     [facility]    Facility:   [facility] [[id number]]   Code Status: POLST AVAILABLE    Date of visit 11/23/20, entered in computer at alternate date.  Reevaluation of 80-year-old male admitted to hospital with weakness and falls, COVID-19 positive with pulmonary infiltrates, received remdesivir dexamethasone and IV antibiotic followed with p.o. cefdinir, urinary retention required insertion of Mcdonnell catheter, hyperglycemia while on dexamethasone, stabilized and transferred to TCU for rehabilitation with persistent weakness lower extremities.  History of hypertension hyperlipidemia and diabetes mellitus.    Review of systems: Mcdonnell catheter successfully removed, denies retention, no dysuria or hematuria.  No fever sweats or chills.  Negligible cough, no sputum production.  Energy level remains diminished.  Persistent weakness bilateral lower extremities.  Questions potential length of stay, reviews accommodations he has arranged at time of discharge.  Remainder of 12 point review of systems obtained negative.    Exam: Pleasant male cognitively intact sitting in chair in no apparent distress.  Hemodynamically stable, afebrile, O2 saturation consistently greater than 95%.  Mucous membranes moist.  No HJR.  Continued diminished air movement extreme lung bases, dry crackles right lung base, no rales or wheezes.  Abdomen without masses or tenderness.  Periphery without edema.    Impression and plan:   COVID-19 related pneumonia, minimal abnormalities on pulmonary exam, oxygenation satisfactory, afebrile, albuterol inhaler available as needed.    Recent urinary retention now on Flomax, mcdonnell catheter has been removed, continues post  void residuals, satisfactory output and urine flow.    Diabetes mellitus on oral agents with adequate control.    Hypertension with adequate control.    Hyperlipidemia continuing statin.    Significant weakness and history of falls, ongoing rehabilitation indicated.      Electronically signed by: Kristie Andrew MD

## 2021-06-21 NOTE — LETTER
Letter by Kendal Barbosa CNP at      Author: Kendal Barbosa CNP Service: -- Author Type: --    Filed:  Encounter Date: 11/27/2020 Status: (Other)         Patient: Dio Curran   MR Number: [id number]   YOB: 1940   Date of Visit: 11/27/2020     [facility]    Facility:   [facility] [[id number]]   Code Status: FULL CODE and POLST AVAILABLE  PCP: Rajesh Lewis MD   Phone: [phone]   Fax: [phone]      CHIEF COMPLAINT/REASON FOR VISIT:  Chief Complaint   Patient presents with   ? Discharge Summary       HISTORY COURSE:  Dio is a 80 y.o. male who  has no past medical history on file. He however, has a past medical history including HTN, DMII, HLD, hypomagnesemia, ED. He was recently admitted to the hospital after suffering a fall from weakness, where he was found to have pneumonia related to COVID-19. He was admitted to [facility] from 11/10/2020 to 11/14/2020. The discharging provider summarized the hospitalization and previous notes.    Today Navneet is being evaluated for a discharge from the TCU.  He has undergone physical and occupational therapy programs and is graduated from the programs.  He still has not reached baseline and therefore will have home occupational and physical therapy programs.  He states that he is otherwise feeling well.  He has no acute problems or concerns to share.  He states that he has been eating, drinking and eliminating.  Nursing staff feel that he continues to only have with nurses.  He has not had any significant medication changes while in the TCU.  Nvaneet denies any other concerns including fevers/chills, cough or cold symptoms, headaches, vision changes, chest pain/pressure, difficulty breathing, SOB, abdominal pain, nausea, vomiting, diarrhea, dysuria, increasing weakness, increasing pain.    PHYSICAL EXAM:   /60   Pulse 91   Temp 97.1  F (36.2  C)   Resp 16   Wt 162 lb 3.2 oz (73.6 kg)   SpO2 96%   BMI  22.62 kg/m      A limited exam was performed due to recommendations for care during COVID-19 pandemic. Due to the 2020 COVID-19 pandemic, except as noted above, the patient was visually observed at a 6 foot plus distance.  An observational exam was performed in an effort to keep patient safe from COVID-19 and other communicable diseases.     General appearance: alert, appears stated age and cooperative  HEENT: Head is normocephalic with normal hair distribution. No evidence of trauma. Ears: Without lesions or deformity. No acute purulent discharge. Eyes: Conjunctivae pink with no scleral icterus or erythema. Nose: Normal. Oropharnyx: mmm.  Lungs: respirations without effort.  Extremities: extremities normal, atraumatic, no cyanosis.  Skin: Skin color, texture normal. No rashes or lesions on exposed skin.   Neurologic: Grossly normal   Psych: interacts well with caregivers, exhibits logical thought processes and connections, pleasant.    MEDICATION LIST:  Current Outpatient Medications   Medication Sig   ? apixaban ANTICOAGULANT (ELIQUIS) 2.5 mg Tab tablet Take 1 tablet (2.5 mg total) by mouth 2 (two) times a day for 26 days.   ? aspirin 81 MG EC tablet Take 81 mg by mouth daily.    ? atenoloL (TENORMIN) 25 MG tablet Take 1 tablet (25 mg total) by mouth daily.   ? blood glucose test strips Accu-Chek Nuris  -Use to check blood sugars three times a day. Follow manufactures directions for use.Type II or unspecified type diabetes mellitus without mention of complication, not stated as uncontrolled  E11.9   ? blood-glucose meter Misc Use daily with test strips to check blood sugars.   ? calcium, as carbonate, (OS-RUBEN) 500 mg calcium (1,250 mg) tablet Take 1 tablet by mouth daily.   ? cholecalciferol, vitamin D3, (VITAMIN D3) 1,000 unit capsule Take 1,000 Units by mouth daily.   ? co-enzyme Q-10 50 mg capsule Take 50 mg by mouth daily.   ? fish oil-omega-3 fatty acids (FISH OIL) 300-1,000 mg capsule Take 2 g by mouth  daily.   ? generic lancets (ACCU-CHEK SOFTCLIX LANCETS) USE AS DIRECTED  TO TEST BLOOD GLUCOSE AS NEEDED   ? glipiZIDE (GLUCOTROL) 10 MG tablet Take 1 tablet by mouth twice daily   ? glucosamine-chondroitin 500-400 mg cap Take 2 capsules by mouth daily.   ? metFORMIN (GLUCOPHAGE) 1000 MG tablet Take 1 tablet (1,000 mg total) by mouth 2 (two) times a day with meals.   ? multivitamin therapeutic tablet Take 1 tablet by mouth daily.   ? ONETOUCH ULTRA BLUE TEST STRIP strips USE 1 EACH AS DIRECTED 3 (THREE) TIMES A DAY.   ? saw palmetto fruit 450 mg cap Take 2 capsules by mouth daily.   ? sildenafil (VIAGRA) 100 MG tablet Take 1 tablet (100 mg total) by mouth as needed for erectile dysfunction.   ? simvastatin (ZOCOR) 40 MG tablet Take 1 tablet daily at bedtime.   ? tamsulosin (FLOMAX) 0.4 mg cap Take 1 capsule (0.4 mg total) by mouth daily after supper.       DISCHARGE DIAGNOSIS:    ICD-10-CM    1. Pneumonia due to COVID-19 virus  U07.1     J12.89    2. Physical deconditioning  R53.81    3. 2019 novel coronavirus disease (COVID-19)  U07.1    4. Protein-calorie malnutrition, severe (H)  E43        MEDICAL EQUIPMENT NEEDS:  None today.    DISCHARGE PLAN/FACE TO FACE:  I certify that services are/were furnished while this patient was under the care of a physician and that a physician or an allowed non-physician practitioner (NPP), had a face-to-face encounter that meets the physician face-to-face encounter requirements. The encounter was in whole, or in part, related to the primary reason for home health. The patient is confined to his/her home and needs intermittent skilled nursing, physical therapy, speech-language pathology, or the continued need for occupational therapy. A plan of care has been established by a physician and is periodically reviewed by a physician.  Date of Face-to-Face Encounter: 11/27/2020    I certify that, based on my findings, the following services are medically necessary home health services:  home health aid for bathing and ADLs, skilled nursing (RN) for medication set-up and teaching, symptoms and disease process monitoring and education, PT for strengthening, balance, endurance and safety within the home, OT for strengthening, ADL needs, adaptive equipment and safety.    My clinical findings support the need for the above skilled services because: home health aid for bathing and ADLs, skilled nursing (RN) for medication set-up and teaching, symptoms and disease process monitoring and education, PT for strengthening, balance, endurance and safety within the home, OT for strengthening, ADL needs, adaptive equipment and safety.    This patient is homebound because: he is a frail elderly gentleman with multiple comorbidities and recent hospitalizations, including a hospitalization for COVID-19 and the current COVID-19 pandemic making it unsafe for him to go out into the community for services.    The patient is, or has been, under my care and I have initiated the establishment of the plan of care. This patient will be followed by a physician who will periodically review the plan of care. Schedule follow up visit with primary care provider within 7 days to reestablish care.    Electronically signed by: Kendal Barbosa CNP

## 2021-06-21 NOTE — LETTER
"Letter by Kendal Barbosa CNP at      Author: Kendal Barbosa CNP Service: -- Author Type: --    Filed:  Encounter Date: 11/18/2020 Status: (Other)         Patient: Dio Curran   MR Number: [id number]   YOB: 1940   Date of Visit: 11/18/2020     [facility]    Facility:   [facility] [[id number]]   Code Status: UNKNOWN      CHIEF COMPLAINT/REASON FOR VISIT:  Chief Complaint   Patient presents with   ? Review Of Multiple Medical Conditions     TCU intake-COVID-19, pneumonia, physical deconditioning, severe malnutrition       HISTORY:      HPI: Dio is a 80 y.o. male who  has no past medical history on file. He however, has a past medical history including HTN, DMII, HLD, hypomagnesemia, ED. He was recently admitted to the hospital after suffering a fall from weakness, where he was found to have pneumonia related to COVID-19. He was admitted to [facility] from 11/10/2020 to 11/14/2020. The discharging provider summarized the hospitalization as follows:     \"80 y.o. male with a past medical history of diabetes mellitus type 2, hypertension dyslipidemia recently tested positive for covid 19, presented to the emergency room with complaints of multiple falls who was admitted on 11/10/2020 for generalized weakness, chest x-ray showed worsening infiltrates, consistent with COVID 19 pneumonia vs bacterial pneumonia. started on remdesivir, dexamethasone, antibiotics. developed urinary retention, mcdonnell catheter was placed.  Started Flomax. Void trial in 3-4 days. He is discharged to TCU.\"    Today Navneet is being evaluated for an intake into the TCU. He shares he has been overall doing ok. He is frightened by his extreme weakness and states it is unsettling to him because he has always been very healthy and is a self described jock. He shares he has been mowing his own yard by push mower which is 2+ acres, which takes him 4 hours and he does it twice weekly. He does this " because he enjoys it and would like to be fit. He states he has never been weak and has never been sick like this. He is motivated to get well and stronger very quickly. Navneet shares he has not had any pain or any discomfort. He has been attempting to get better and will continue to work with therapies. He shares he has been eating, drinking and eliminating well. However, he was screened by the dietician and found to have malnutrition. He has had a decreased appetite the past two weeks due to COVID-19, muscle mass loss, weight loss. He was approximately 179# in July 2020. He weighed 166# in the hospital. Navneet denies any other concerns including fevers/chills, cough or cold symptoms, headaches, vision changes, chest pain/pressure, difficulty breathing, SOB, abdominal pain, nausea, vomiting, diarrhea, dysuria, increasing weakness, increasing pain.    No past medical history on file.          No family history on file.  Social History     Socioeconomic History   ? Marital status:      Spouse name: Not on file   ? Number of children: Not on file   ? Years of education: Not on file   ? Highest education level: Not on file   Occupational History   ? Not on file   Social Needs   ? Financial resource strain: Not on file   ? Food insecurity     Worry: Not on file     Inability: Not on file   ? Transportation needs     Medical: Not on file     Non-medical: Not on file   Tobacco Use   ? Smoking status: Former Smoker     Types: Cigarettes, Cigars   ? Smokeless tobacco: Never Used   ? Tobacco comment: cigars once in a while   Substance and Sexual Activity   ? Alcohol use: Yes     Frequency: Monthly or less   ? Drug use: Not Currently   ? Sexual activity: Not on file   Lifestyle   ? Physical activity     Days per week: Not on file     Minutes per session: Not on file   ? Stress: Not on file   Relationships   ? Social connections     Talks on phone: Not on file     Gets together: Not on file     Attends Sikhism service: Not  on file     Active member of club or organization: Not on file     Attends meetings of clubs or organizations: Not on file     Relationship status: Not on file   ? Intimate partner violence     Fear of current or ex partner: Not on file     Emotionally abused: Not on file     Physically abused: Not on file     Forced sexual activity: Not on file   Other Topics Concern   ? Not on file   Social History Narrative   ? Not on file       REVIEW OF SYSTEM:  Pertinent items are noted in HPI.    PHYSICAL EXAM:   /75   Pulse 72   Temp 97  F (36.1  C)   Resp 18   Wt 162 lb 3.2 oz (73.6 kg)   SpO2 93%   BMI 22.62 kg/m      A limited exam was performed due to recommendations for care during COVID-19 pandemic. Due to the 2020 COVID-19 pandemic, except as noted above, the patient was visually observed at a 6 foot plus distance.  An observational exam was performed in an effort to keep patient safe from COVID-19 and other communicable diseases.     General appearance: alert, appears stated age and cooperative  HEENT: Head is normocephalic with normal hair distribution. No evidence of trauma. Ears: Without lesions or deformity. No acute purulent discharge. Eyes: Conjunctivae pink with no scleral icterus or erythema. Nose: Normal. Oropharnyx: mmm.  Lungs: respirations without effort.  Extremities: extremities normal, atraumatic, no cyanosis.  Skin: Skin color, texture normal. No rashes or lesions on exposed skin.   Neurologic: Grossly normal   Psych: interacts well with caregivers, exhibits logical thought processes and connections, pleasant.      LABS:   None today.     ASSESSMENT:      ICD-10-CM    1. 2019 novel coronavirus disease (COVID-19)  U07.1    2. Protein-calorie malnutrition, severe (H)  E43    3. Pneumonia due to COVID-19 virus  U07.1     J12.89    4. Physical deconditioning  R53.81        PLAN:    COVID-19 Infection, Pneumonia due to COVID-19  -COVID-19 PCR positive.   -Albuterol 2 puffs with chamber every 4  hours as needed for shortness of breath or wheeze.   -Tylenol 650 mg by mouth four times a day for fever, pain as needed.   -Vitamin D3 5000 iu by mouth daily.   -Quarantine.   -Follow carefully.     Physical Deconditioning  -Continue PT/OT and other therapies as per care plan.  -Encouraged good nutrition and movement habits.   -Discussed care plan and expected course of stay.   -Continue to follow-up per routine schedule or sooner if needed.     Severe Protein Calorie Malnutrition  -Eval and treat by dietician.   -Supplementation by Ensure 8 ounces two times a day.   -Follow up as needed.     Admission history and physical per MD in the next 30 days. At this time continue current care plan for all chronic medical conditions, as they are stable. Encouraged patient to engage in PT/OT for strengthening and conditioning. Encouraged patient to work closely with nursing staff to ensure any medical complaints are quickly addressed. Follow up this week or sooner if needed. Will continue to monitor patient and work with nursing staff collaboratively to work toward positive patient outcomes.    Total unit/floor time of 50 minutes time consisted of the following: time spent with patient, examination of patient, reviewing the record including pertinent labs and completing documentation. More than 50% of this time was spent in coordination of care time with nursing staff and other healthcare providers, this time was spent on discussion/counseling on current care plan including medical management of chronic health problems and acute health problems, education pertaining to plan, and discussion of the goals of care pertaining to the current outlined plan with nursing staff and patient.    Electronically signed by: Kendal Barbosa CNP

## 2021-06-21 NOTE — LETTER
"Letter by Kendal Barbosa CNP at      Author: Kendal Barbosa CNP Service: -- Author Type: --    Filed:  Encounter Date: 11/25/2020 Status: (Other)         [facility]  [address] [address]                                  December 1, 2020    Patient: Dio Curran   MR Number: [id number]   YOB: 1940   Date of Visit: 11/25/2020     Dear Dr. Layne:    Thank you for referring Dio Curran to me for evaluation. Below are the relevant portions of my assessment and plan of care.    If you have questions, please do not hesitate to call me. I look forward to following Dio along with you.    Sincerely,        Kendal Barbosa CNP          CC  No Recipients  Kendal Barbosa CNP  12/1/2020  4:18 PM  Signed  [facility]    Facility:   [facility] [[id number]]   Code Status: UNKNOWN      CHIEF COMPLAINT/REASON FOR VISIT:  Chief Complaint   Patient presents with   ? Review Of Multiple Medical Conditions     Severe protein calorie malnutrition, COVID-19, status post COVID-19 pneumonia, physical deconditioning       HISTORY:      HPI: Dio is a 80 y.o. male who  has no past medical history on file. He however, has a past medical history including HTN, DMII, HLD, hypomagnesemia, ED. He was recently admitted to the hospital after suffering a fall from weakness, where he was found to have pneumonia related to COVID-19. He was admitted to [facility] from 11/10/2020 to 11/14/2020. The discharging provider summarized the hospitalization as follows:     \"80 y.o. male with a past medical history of diabetes mellitus type 2, hypertension dyslipidemia recently tested positive for covid 19, presented to the emergency room with complaints of multiple falls who was admitted on 11/10/2020 for generalized weakness, chest x-ray showed worsening infiltrates, consistent with COVID 19 pneumonia vs bacterial pneumonia. started on remdesivir, " "dexamethasone, antibiotics. developed urinary retention, mcdonnell catheter was placed.  Started Flomax. Void trial in 3-4 days. He is discharged to TCU.\"    Today Navneet is being evaluated for a routine review of multiple medical problems while in transitional care unit.  Navneet continues to get better and better, however he is very upset at the pace of progress. He was hopeful that he would be bouncing right back. Navneet has no aches or pains. He has been working with therapies and is making progress. He has been eating, drinking and eliminating well. Discharge planning to commence. Navneet denies any other concerns including fevers/chills, cough or cold symptoms, headaches, vision changes, chest pain/pressure, difficulty breathing, SOB, abdominal pain, nausea, vomiting, diarrhea, dysuria, increasing weakness, increasing pain.    No past medical history on file.          No family history on file.  Social History     Socioeconomic History   ? Marital status:      Spouse name: Not on file   ? Number of children: Not on file   ? Years of education: Not on file   ? Highest education level: Not on file   Occupational History   ? Not on file   Social Needs   ? Financial resource strain: Not on file   ? Food insecurity     Worry: Not on file     Inability: Not on file   ? Transportation needs     Medical: Not on file     Non-medical: Not on file   Tobacco Use   ? Smoking status: Former Smoker     Types: Cigarettes, Cigars   ? Smokeless tobacco: Never Used   ? Tobacco comment: cigars once in a while   Substance and Sexual Activity   ? Alcohol use: Yes     Frequency: Monthly or less   ? Drug use: Not Currently   ? Sexual activity: Not on file   Lifestyle   ? Physical activity     Days per week: Not on file     Minutes per session: Not on file   ? Stress: Not on file   Relationships   ? Social connections     Talks on phone: Not on file     Gets together: Not on file     Attends Congregation service: Not on file     Active member of " "club or organization: Not on file     Attends meetings of clubs or organizations: Not on file     Relationship status: Not on file   ? Intimate partner violence     Fear of current or ex partner: Not on file     Emotionally abused: Not on file     Physically abused: Not on file     Forced sexual activity: Not on file   Other Topics Concern   ? Not on file   Social History Narrative   ? Not on file       REVIEW OF SYSTEM:  Pertinent items are noted in HPI.    PHYSICAL EXAM:   /68   Pulse 77   Temp (!) 95  F (35  C)   Resp 18   Ht 5' 11\" (1.803 m)   Wt 162 lb 3.2 oz (73.6 kg)   SpO2 95%   BMI 22.62 kg/m      A limited exam was performed due to recommendations for care during COVID-19 pandemic. Due to the 2020 COVID-19 pandemic, except as noted above, the patient was visually observed at a 6 foot plus distance.  An observational exam was performed in an effort to keep patient safe from COVID-19 and other communicable diseases.     General appearance: alert, appears stated age and cooperative  HEENT: Head is normocephalic with normal hair distribution. No evidence of trauma. Ears: Without lesions or deformity. No acute purulent discharge. Eyes: Conjunctivae pink with no scleral icterus or erythema. Nose: Normal. Oropharnyx: mmm.  Lungs: respirations without effort.  Extremities: extremities normal, atraumatic, no cyanosis.  Skin: Skin color, texture normal. No rashes or lesions on exposed skin.   Neurologic: Grossly normal   Psych: interacts well with caregivers, exhibits logical thought processes and connections, pleasant.      LABS:   None today.     ASSESSMENT:      ICD-10-CM    1. 2019 novel coronavirus disease (COVID-19)  U07.1    2. Pneumonia due to COVID-19 virus  U07.1     J12.89    3. Protein-calorie malnutrition, severe (H)  E43    4. Physical deconditioning  R53.81        PLAN:    Care plan reviewed and remains appropriate. Therapies to continue.     COVID-19 Infection, Pneumonia due to " COVID-19  -COVID-19 PCR positive.   -Albuterol 2 puffs with chamber every 4 hours as needed for shortness of breath or wheeze.   -Tylenol 650 mg by mouth four times a day for fever, pain as needed.   -Vitamin D3 5000 iu by mouth daily.   -Quarantine.   -Follow carefully.     Physical Deconditioning  -Continue PT/OT and other therapies as per care plan.  -Encouraged good nutrition and movement habits.   -Discussed care plan and expected course of stay.   -Continue to follow-up per routine schedule or sooner if needed.     Severe Protein Calorie Malnutrition  -Eval and treat by dietician.   -Supplementation by Ensure 8 ounces two times a day.   -Follow up as needed.     Otherwise continue current care plan for all other chronic medical conditions, as they are stable. Encouraged patient to engage in healthy lifestyle behaviors such as engaging in social activities, exercising (PT/OT), eating well, and following care plan. Follow up for routine check-up, or sooner if needed. Will continue to monitor patient and work with nursing staff collaboratively to work toward positive patient outcomes.    Electronically signed by: Kendal Barbosa CNP

## 2021-06-21 NOTE — LETTER
Letter by Kristie Andrew MD at      Author: Kristie Andrew MD Service: -- Author Type: --    Filed:  Encounter Date: 11/23/2020 Status: (Other)         Patient: Dio Curran   MR Number: [id number]   YOB: 1940   Date of Visit: 11/23/2020     [facility]    Facility:   [facility] [[id number]]   Code Status: POLST AVAILABLE    Date of visit 11/19/20, entered in computer at later date.  Admission evaluation to TCU of 80-year-old male.  History is taken from hospital notes and from patient who provides an excellent history.  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    Past medical history: Hypertension, diabetes mellitus, hyperlipidemia, weakness lower extremities.    Current medication list, drug allergies, code status reviewed in epic.    Social: Non-smoker, no excessive alcohol intake.    Family history: Hypertension    Review of systems: Significant weakness bilateral lower extremities.  Kohli catheter remains in place, no dysuria.  Used albuterol in hospital, desires to have a albuterol available as needed.  No dyspnea at rest.  Generalized fatigue present.  No fever sweats or chills.  Decreased sensation feet.  No cognitive impairment.  Remainder of 12 point review of systems obtained negative.    Exam: Pleasant male, conversant, oriented, sitting in chair in no apparent distress.  Blood pressure 133/68, heart  rate 77, temperature 95, O2 95%.  Cognitively intact.  No facial asymmetry.  No pharyngeal erythema.  No HJR or cervical adenopathy.  S1 and S2 regular.  Pulmonary exam with minimal diminished air movement lung bases, no rales rhonchi or wheezes.  Abdomen without hepatosplenomegaly or masses.  Periphery without edema, trace venous stasis changes, strength diminished distal.    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    Significant weakness bilateral lower extremities and history of multiple falls, weakness continues, continue rehabilitation.    Diabetes mellitus on Metformin 1000 mg twice daily and glipizide 10 mg twice daily, Accu-Cheks will be followed.    Urinary retention on Flomax, Kohli catheter in place, trial removal over next 24 hours.    Recent hypomagnesemia replaced.    Dyslipidemia on statin.    Hypertension on Tenormin 25 mg with satisfactory control of blood pressure.    Hospital records reviewed, clinical review of patient, review of medications.      Electronically signed by: Kristie Andrew MD

## 2021-06-21 NOTE — LETTER
Letter by Rajesh Lewis MD at      Author: Rajesh Lewis MD Service: -- Author Type: --    Filed:  Encounter Date: 2/1/2021 Status: (Other)       02/01/21          NAME:Dio Curran   YOB: 1940        Dio Curran was diagnosed with COVID-19 on 11/04/2020 with a viral PCR test and has completely recovered.  I have evaluated Dio on 02/01/2021 and determined that he is suited to travel via airline.

## 2021-06-23 NOTE — TELEPHONE ENCOUNTER
Who is calling:  Patient   Reason for Call:  Patient was told by [facility] mail order pharmacy to reach out to us and let clinic know to be on the lookout for 4 medication refill requests. Atenolol, 25 mg, Glipizide 10 mg, Metformin HCL 1000 mg, and Simvastatin 40 mg. They will be sending over request for 3 month supply.  FYI  Date of last appointment with primary care: 6/19/18  Okay to leave a detailed message: Yes

## 2021-06-23 NOTE — TELEPHONE ENCOUNTER
Medication Request  Medication name: Sildenafil (Generic of Viagra)  Pharmacy Name and Location: [facility] [address] [place] [address].  Reason for request: Patient's wife  4 years ago and he hasn't been with anyone since, but is now seeing someone and will need this medication. Patient did not want to schedule an appointment until he is due for his physical in . Patient asking if PCP can fill out a hand written script for him to bring to his pharmacy, either 20 mg or 40 mg, whichever provider thinks is best.   When did you use medication last?:  Last filled 2015  Okay to leave a detailed message: No. Just leave generic message.

## 2021-06-25 NOTE — TELEPHONE ENCOUNTER
RN cannot approve Refill Request    RN can NOT refill this medication Protocol failed and NO refill given.     Yasmeen Govea, [facility] Triage/Med Refill 3/19/2019    Requested Prescriptions   Pending Prescriptions Disp Refills     glipiZIDE (GLUCOTROL) 10 MG tablet [Pharmacy Med Name: GLIPIZIDE 10 MG TABLET] 180 tablet 3     Sig: TAKE 1 TABLET (10 MG TOTAL) BY MOUTH 2 (TWO) TIMES A DAY 1/2 HOUR BEFORE MEALS    Oral Hypoglycemics Refill Protocol Failed - 3/17/2019  8:22 AM       Failed - Visit with PCP or prescribing provider visit in last 6 months      Last office visit with prescriber/PCP: Visit date not found OR same dept: Visit date not found OR same specialty: 8/3/2016 Deanne Stover DO Last physical: Visit date not found Last MTM visit: Visit date not found         Next appt within 3 mo: Visit date not found  Next physical within 3 mo: Visit date not found  Prescriber OR PCP: Rajesh Lewis MD  Last diagnosis associated with med order: 1. Type 2 diabetes mellitus (H)  - glipiZIDE (GLUCOTROL) 10 MG tablet [Pharmacy Med Name: GLIPIZIDE 10 MG TABLET]; TAKE 1 TABLET (10 MG TOTAL) BY MOUTH 2 (TWO) TIMES A DAY 1/2 HOUR BEFORE MEALS  Dispense: 180 tablet; Refill: 3     If protocol passes may refill for 12 months if within 3 months of last provider visit (or a total of 15 months).          Failed - A1C in last 6 months    Hemoglobin A1c   Date Value Ref Range Status   06/19/2018 7.0 (H) 3.5 - 6.0 % Final              Passed - Microalbumin in last year     Microalbumin, Random Urine   Date Value Ref Range Status   06/19/2018 7.35 (H) 0.00 - 1.99 mg/dL Final                 Passed - Blood pressure in last year    BP Readings from Last 1 Encounters:   06/19/18 138/80            Passed - Serum creatinine in last year    Creatinine   Date Value Ref Range Status   06/19/2018 1.05 0.70 - 1.30 mg/dL Final

## 2021-07-03 NOTE — ADDENDUM NOTE
Addendum Note by Bonnie Pierce MD at 1/15/2019  8:00 AM     Author: Bonnie Pierce MD Service: -- Author Type: Physician    Filed: 1/15/2019  8:00 AM Encounter Date: 1/14/2019 Status: Signed    : Bonnie Pierce MD (Physician)    Addended by: BONNIE PIERCE on: 1/15/2019 08:00 AM        Modules accepted: Orders

## 2021-07-30 ENCOUNTER — OFFICE VISIT (OUTPATIENT)
Dept: FAMILY MEDICINE | Facility: CLINIC | Age: 81
End: 2021-07-30
Payer: COMMERCIAL

## 2021-07-30 VITALS
OXYGEN SATURATION: 99 % | SYSTOLIC BLOOD PRESSURE: 128 MMHG | DIASTOLIC BLOOD PRESSURE: 76 MMHG | WEIGHT: 174 LBS | BODY MASS INDEX: 24.91 KG/M2 | HEART RATE: 54 BPM | HEIGHT: 70 IN

## 2021-07-30 DIAGNOSIS — E11.9 TYPE 2 DIABETES MELLITUS WITHOUT COMPLICATION, WITHOUT LONG-TERM CURRENT USE OF INSULIN (H): Primary | ICD-10-CM

## 2021-07-30 DIAGNOSIS — E78.5 HYPERLIPIDEMIA, UNSPECIFIED HYPERLIPIDEMIA TYPE: ICD-10-CM

## 2021-07-30 DIAGNOSIS — R53.81 PHYSICAL DECONDITIONING: ICD-10-CM

## 2021-07-30 DIAGNOSIS — Z12.5 SCREENING FOR PROSTATE CANCER: ICD-10-CM

## 2021-07-30 DIAGNOSIS — I10 HYPERTENSION, UNSPECIFIED TYPE: ICD-10-CM

## 2021-07-30 DIAGNOSIS — Z86.16 HISTORY OF 2019 NOVEL CORONAVIRUS DISEASE (COVID-19): ICD-10-CM

## 2021-07-30 LAB
ALBUMIN SERPL-MCNC: 4.1 G/DL (ref 3.5–5)
ALP SERPL-CCNC: 68 U/L (ref 45–120)
ALT SERPL W P-5'-P-CCNC: 21 U/L (ref 0–45)
ANION GAP SERPL CALCULATED.3IONS-SCNC: 12 MMOL/L (ref 5–18)
AST SERPL W P-5'-P-CCNC: 18 U/L (ref 0–40)
BILIRUB SERPL-MCNC: 1.2 MG/DL (ref 0–1)
BUN SERPL-MCNC: 14 MG/DL (ref 8–28)
CALCIUM SERPL-MCNC: 9.7 MG/DL (ref 8.5–10.5)
CHLORIDE BLD-SCNC: 106 MMOL/L (ref 98–107)
CHOLEST SERPL-MCNC: 128 MG/DL
CO2 SERPL-SCNC: 21 MMOL/L (ref 22–31)
CREAT SERPL-MCNC: 1.21 MG/DL (ref 0.7–1.3)
CREAT UR-MCNC: 99 MG/DL
FASTING STATUS PATIENT QL REPORTED: YES
GFR SERPL CREATININE-BSD FRML MDRD: 57 ML/MIN/1.73M2
GLUCOSE BLD-MCNC: 221 MG/DL (ref 70–125)
HBA1C MFR BLD: 7.8 % (ref 0–5.6)
HDLC SERPL-MCNC: 42 MG/DL
LDLC SERPL CALC-MCNC: 63 MG/DL
MICROALBUMIN UR-MCNC: 4.64 MG/DL (ref 0–1.99)
MICROALBUMIN/CREAT UR: 46.9 MG/G CR
POTASSIUM BLD-SCNC: 4.8 MMOL/L (ref 3.5–5)
PROT SERPL-MCNC: 6.6 G/DL (ref 6–8)
PSA SERPL-MCNC: 1.21 UG/L (ref 0–6.5)
SODIUM SERPL-SCNC: 139 MMOL/L (ref 136–145)
TRIGL SERPL-MCNC: 115 MG/DL

## 2021-07-30 PROCEDURE — 80053 COMPREHEN METABOLIC PANEL: CPT | Performed by: FAMILY MEDICINE

## 2021-07-30 PROCEDURE — 36415 COLL VENOUS BLD VENIPUNCTURE: CPT | Performed by: FAMILY MEDICINE

## 2021-07-30 PROCEDURE — 83036 HEMOGLOBIN GLYCOSYLATED A1C: CPT | Performed by: FAMILY MEDICINE

## 2021-07-30 PROCEDURE — 99213 OFFICE O/P EST LOW 20 MIN: CPT | Mod: 25 | Performed by: FAMILY MEDICINE

## 2021-07-30 PROCEDURE — 80061 LIPID PANEL: CPT | Performed by: FAMILY MEDICINE

## 2021-07-30 PROCEDURE — 90471 IMMUNIZATION ADMIN: CPT | Performed by: FAMILY MEDICINE

## 2021-07-30 PROCEDURE — 90715 TDAP VACCINE 7 YRS/> IM: CPT | Performed by: FAMILY MEDICINE

## 2021-07-30 PROCEDURE — 99397 PER PM REEVAL EST PAT 65+ YR: CPT | Mod: 25 | Performed by: FAMILY MEDICINE

## 2021-07-30 PROCEDURE — 82043 UR ALBUMIN QUANTITATIVE: CPT | Performed by: FAMILY MEDICINE

## 2021-07-30 PROCEDURE — G0103 PSA SCREENING: HCPCS | Performed by: FAMILY MEDICINE

## 2021-07-30 ASSESSMENT — MIFFLIN-ST. JEOR: SCORE: 1510.51

## 2021-07-30 ASSESSMENT — ACTIVITIES OF DAILY LIVING (ADL): CURRENT_FUNCTION: NO ASSISTANCE NEEDED

## 2021-07-30 NOTE — PROGRESS NOTES
SUBJECTIVE:   Dio Curran is a 81 year old male who presents for Preventive Visit.      Patient has been advised of split billing requirements and indicates understanding: Yes   Are you in the first 12 months of your Medicare coverage?  No    HPI  Do you feel safe in your environment? No    He is type 2 diabetes on Metformin and glipizide.  Reports variable blood sugars, 1 concern for low blood sugar not an ongoing concern.  Discussed ways to minimize lows.  Is mild neuropathy symptoms by history and normal foot exam is performed today.  Blood pressure reasonably controlled.  Cholesterol has been excellent.  Has tendency toward hyperkalemia which is likely artifactual from blood draw.  Still has some physical deconditioning from bout of Covid this past winter.  Has otherwise made a fairly good recovery.  States he can work harder on diet and exercise to improve blood sugar control.  Would like to have PSA test done for prostate cancer screening.  Reviewed other routine health concerns.  Overall doing well.    Have you ever done Advance Care Planning? (For example, a Health Directive, POLST, or a discussion with a medical provider or your loved ones about your wishes): Yes, advance care planning is on file.      Cognitive Screening   1) Repeat 3 items   2) Clock draw: NORMAL  3) 3 item recall: Recalls 3 objects  Results: 3 items recalled: COGNITIVE IMPAIRMENT LESS LIKELY    Mini-CogTM Copyright S Maurizio. Licensed by the author for use in [facility]; reprinted with permission ([place]@.[email or web address]). All rights reserved.          Reviewed and updated as needed this visit by clinical staff    Reviewed and updated as needed this visit by Provider                Social History     Tobacco Use     Smoking status: Former Smoker     Types: Cigarettes, Cigars     Smokeless tobacco: Never Used     Tobacco comment: cigars once in a while   Substance Use Topics     Alcohol use: Yes         Alcohol Use 7/30/2021  "  Prescreen: >3 drinks/day or >7 drinks/week? No         Current providers sharing in care for this patient include:   Patient Care Team:  Rajesh Lewis MD as PCP - General  Rajesh Lewis MD as Assigned PCP    The following health maintenance items are reviewed in Epic and correct as of today:  Health Maintenance Due   Topic Date Due     DIABETIC FOOT EXAM  Never done     ANNUAL REVIEW OF HM ORDERS  Never done     HEPATITIS C SCREENING  Never done     DTAP/TDAP/TD IMMUNIZATION (2 - Td or Tdap) 02/25/2021     EYE EXAM  06/08/2021     LIPID  07/10/2021     MICROALBUMIN  07/10/2021       Review of Systems  Complete review of systems is obtained.  Other than the specific considerations noted above complete review of systems is negative.      OBJECTIVE:   /76   Pulse 54   Ht 1.778 m (5' 10\")   Wt 78.9 kg (174 lb)   SpO2 99%   BMI 24.97 kg/m   Estimated body mass index is 24.97 kg/m  as calculated from the following:    Height as of this encounter: 1.778 m (5' 10\").    Weight as of this encounter: 78.9 kg (174 lb).  Physical Exam          General Appearance:    Alert, cooperative, no distress   Eyes:   No scleral icterus or conjunctival irritation       Ears:    Normal TM's and external ear canals, both ears   Throat:   Lips, mucosa, and tongue normal; teeth and gums normal   Neck:   Supple, symmetrical, trachea midline, no adenopathy;        thyroid:  No enlargement/tenderness/nodules   Lungs:     Clear to auscultation bilaterally, respirations unlabored, no wheezes or crackles   Heart:    Regular rate and rhythm,  No murmur   Abdomen:    Soft, no distention, no tenderness on palpation, no masses, no organomegaly                   Genitalia:   Normal testicular anatomy, no inguinal hernias, no skin findings in the genital region   Rectal:    Normal tone, no hemorrhoids masses or other anal rectal findings, prostate has a smooth uniform consistency without nodules       Extremities:  No edema, no joint " "swelling or redness, no evidence of any injuries   Skin:  No concerning skin findings, no suspicious moles, no rashes   Neurologic:  On gross examination there is no motor or sensory deficit.  Patient walks with a normal gait                             ASSESSMENT / PLAN:   Dio was seen today for medicare visit, diabetes, imm/inj, labs only, recheck medication and medication problem.    Diagnoses and all orders for this visit:    Type 2 diabetes mellitus without complication, without long-term current use of insulin (H)  -     Hemoglobin A1c  -     Albumin Random Urine Quantitative with Creat Ratio    Hypertension, unspecified type  -     Comprehensive metabolic panel (BMP + Alb, Alk Phos, ALT, AST, Total. Bili, TP)    Hyperlipidemia  -     Lipid panel reflex to direct LDL Fasting    History of 2019 novel coronavirus disease (COVID-19)    Physical deconditioning    Screening for prostate cancer  -     PSA, screen; Future    Other orders  -     TDAP VACCINE (Adacel, Boostrix)  [[id number]]           Patient has been advised of split billing requirements and indicates understanding: Yes  COUNSELING:    Estimated body mass index is 24.97 kg/m  as calculated from the following:    Height as of this encounter: 1.778 m (5' 10\").    Weight as of this encounter: 78.9 kg (174 lb).    Weight management plan noted, stable and monitoring    He reports that he has quit smoking. His smoking use included cigarettes and cigars. He has never used smokeless tobacco.      Appropriate preventive services were discussed with this patient, including applicable screening as appropriate for cardiovascular disease, diabetes, osteopenia/osteoporosis, and glaucoma.  As appropriate for age/gender, discussed screening for colorectal cancer, prostate cancer, breast cancer, and cervical cancer. Checklist reviewing preventive services available has been given to the patient.    Reviewed patients plan of care and provided an AVS. The Basic Care " Plan (routine screening as documented in Health Maintenance) for Dio meets the Care Plan requirement. This Care Plan has been established and reviewed with the Patient.    Counseling Resources:  ATP IV Guidelines  Pooled Cohorts Equation Calculator  Breast Cancer Risk Calculator  Breast Cancer: Medication to Reduce Risk  FRAX Risk Assessment  ICSI Preventive Guidelines  Dietary Guidelines for Americans, 2010  [employer]'s MyPlate  ASA Prophylaxis  Lung CA Screening    Rajesh Lewis MD, MD  [facility]    Identified Health Risks:

## 2021-07-30 NOTE — LETTER
August 2, 2021    Dio Curran  [id number] [address]    Dear ,    We are writing to inform you of your test results.  Your test results fall within the expected range(s) or remain unchanged from previous results.  Please continue with current treatment plan.    Resulted Orders   Comprehensive metabolic panel (BMP + Alb, Alk Phos, ALT, AST, Total. Bili, TP)   Result Value Ref Range    Sodium 139 136 - 145 mmol/L    Potassium 4.8 3.5 - 5.0 mmol/L    Chloride 106 98 - 107 mmol/L    Carbon Dioxide (CO2) 21 (L) 22 - 31 mmol/L    Anion Gap 12 5 - 18 mmol/L    Urea Nitrogen 14 8 - 28 mg/dL    Creatinine 1.21 0.70 - 1.30 mg/dL    Calcium 9.7 8.5 - 10.5 mg/dL    Glucose 221 (H) 70 - 125 mg/dL    Alkaline Phosphatase 68 45 - 120 U/L    AST 18 0 - 40 U/L    ALT 21 0 - 45 U/L    Protein Total 6.6 6.0 - 8.0 g/dL    Albumin 4.1 3.5 - 5.0 g/dL    Bilirubin Total 1.2 (H) 0.0 - 1.0 mg/dL    GFR Estimate 57 (L) >60 mL/min/1.73m2      Comment:      As of July 11, 2021, eGFR is calculated by the CKD-EPI creatinine equation, without race adjustment. eGFR can be influenced by muscle mass, exercise, and diet. The reported eGFR is an estimation only and is only applicable if the renal function is stable.   Hemoglobin A1c   Result Value Ref Range    Hemoglobin A1C 7.8 (H) 0.0 - 5.6 %      Comment:      Normal <5.7%   Prediabetes 5.7-6.4%    Diabetes 6.5% or higher     Note: Adopted from ADA consensus guidelines.   Lipid panel reflex to direct LDL Fasting   Result Value Ref Range    Cholesterol 128 <=199 mg/dL    Triglycerides 115 <=149 mg/dL    Direct Measure HDL 42 >=40 mg/dL      Comment:      HDL Cholesterol Reference Range:     0-2 years:   No reference ranges established for patients under 2 years old  at [facility] for lipid analytes.    2-8 years:  Greater than 45 mg/dL     18 years and older:   Female: Greater than or equal to 50 mg/dL   Male:   Greater than or equal to 40 mg/dL    LDL  Cholesterol Calculated 63 <=129 mg/dL    Patient Fasting > 8hrs? Yes    Albumin Random Urine Quantitative with Creat Ratio   Result Value Ref Range    Microalbumin Urine mg/dL 4.64 (H) 0.00 - 1.99 mg/dL    Creatinine Urine mg/dL 99 mg/dL    Microalbumin Urine mg/g Cr 46.9 (H) <=19.9 mg/g Cr    Narrative    Microalbumin, Random Urine   <2.0 mg/dL . . . . . . . . Normal   3.0-30.0 mg/dL . . . . . . Microalbuminuria   >30.0 mg/dL . . . . . .  . Clinical Proteinuria     Microalbumin/Creatinine Ratio, Random Urine   <20 mg/g . . . . .. . . . Normal    mg/g . . . . . . . Microalbuminuria   >300 mg/g . . . . . . . . Clinical Proteinuria   PSA, screen   Result Value Ref Range    Prostate Specific Antigen Screen 1.21 0.00 - 6.50 ug/L     The amount of protein in the urine is stable. No significant change.     The PSA test is well within the normal range and compares closely to previous test results there is no concern for prostate cancer.     The electrolytes and kidney function have remained normal. No significant new concern was found. The potassium level remained normal.     The cholesterol numbers overall are very good     If you have any questions or concerns, please call the clinic at the number listed above.   Sincerely,  Rajesh Lewis MD

## 2021-08-02 ENCOUNTER — TELEPHONE (OUTPATIENT)
Dept: FAMILY MEDICINE | Facility: CLINIC | Age: 81
End: 2021-08-02

## 2021-08-02 NOTE — TELEPHONE ENCOUNTER
----- Message from Rajesh Lewis MD sent at 8/1/2021  8:43 PM CDT -----  Please call patient and send a copy of lab test results. The amount of protein in the urine is stable. No significant change.    The PSA test is well within the normal range and compares closely to previous test results there is no concern for prostate cancer.    The electrolytes and kidney function have remained normal. No significant new concern was found. The potassium level remained normal.    The cholesterol numbers overall are very good

## 2021-08-03 PROBLEM — E43 PROTEIN-CALORIE MALNUTRITION, SEVERE (H): Status: RESOLVED | Noted: 2020-11-21 | Resolved: 2021-04-12

## 2021-10-06 ENCOUNTER — TELEPHONE (OUTPATIENT)
Dept: FAMILY MEDICINE | Facility: CLINIC | Age: 81
End: 2021-10-06

## 2021-10-06 DIAGNOSIS — N40.1 BPH WITH OBSTRUCTION/LOWER URINARY TRACT SYMPTOMS: Primary | ICD-10-CM

## 2021-10-06 DIAGNOSIS — N13.8 BPH WITH OBSTRUCTION/LOWER URINARY TRACT SYMPTOMS: Primary | ICD-10-CM

## 2021-10-06 NOTE — TELEPHONE ENCOUNTER
Patient is wanting to start the Flomax as discussed in last visit.     Patient would like to have this set up and sent to insurance/ mail order pharmacy.     Patient requested that he speak with Bobby about this so he gets confirmation that it was completed.

## 2021-10-07 RX ORDER — TAMSULOSIN HYDROCHLORIDE 0.4 MG/1
0.4 CAPSULE ORAL EVERY EVENING
Qty: 90 CAPSULE | Refills: 3 | Status: SHIPPED | OUTPATIENT
Start: 2021-10-07 | End: 2022-10-10

## 2021-10-07 RX ORDER — TAMSULOSIN HYDROCHLORIDE 0.4 MG/1
0.4 CAPSULE ORAL EVERY EVENING
Qty: 90 CAPSULE | Refills: 3 | Status: SHIPPED | OUTPATIENT
Start: 2021-10-07 | End: 2021-10-07

## 2021-10-07 NOTE — TELEPHONE ENCOUNTER
Can you please re-send to the mail order.   I set up again for you.    Thank you    Called and left message for Dio that we will be sending this in.

## 2021-10-16 ENCOUNTER — HEALTH MAINTENANCE LETTER (OUTPATIENT)
Age: 81
End: 2021-10-16

## 2021-10-19 DIAGNOSIS — E11.9 DIABETES MELLITUS, TYPE 2 (H): ICD-10-CM

## 2021-10-19 DIAGNOSIS — E11.9 TYPE 2 DIABETES MELLITUS (H): ICD-10-CM

## 2021-10-19 DIAGNOSIS — E78.5 HYPERLIPIDEMIA: ICD-10-CM

## 2021-10-19 DIAGNOSIS — I10 HTN (HYPERTENSION): ICD-10-CM

## 2021-10-20 NOTE — TELEPHONE ENCOUNTER
"Routing refill request to provider for review/approval because:  Early refill request.    Last Written Prescription Date:  1/12/21  Last Fill Quantity: 300 strips,  # refills: 3   Last office visit provider:  7/30/21    Last Written Prescription Date:  1/12/21  Last Fill Quantity: 90,  # refills: 3   Last office visit provider:  7/30/21    Last Written Prescription Date:  1/12/21  Last Fill Quantity: 180,  # refills: 3   Last office visit provider:  7/30/21          Requested Prescriptions   Pending Prescriptions Disp Refills     ACCU-CHEK PATRICIA PLUS test strip [Pharmacy Med Name: T STRIP  S ACCU CHK PATRICIA PLUS] 300 strip 3     Sig: USE TO CHECK BLOOD SUGARS 3 TIMES DAILY .  FOLLOW  MANUFACTURES DIRECTIONS FOR USE.       Diabetic Supplies Protocol Passed - 10/19/2021 10:22 PM        Passed - Medication is active on med list        Passed - Patient is 18 years of age or older        Passed - Recent (6 mo) or future (30 days) visit within the authorizing provider's specialty     Patient had office visit in the last 6 months or has a visit in the next 30 days with authorizing provider.  See \"Patient Info\" tab in [employer], or \"Choose Columns\" in Meds & Orders section of the refill encounter.               atenolol (TENORMIN) 25 MG tablet [Pharmacy Med Name: Atenolol 25 MG Oral Tablet] 90 tablet 3     Sig: TAKE 1 TABLET BY MOUTH  DAILY       Beta-Blockers Protocol Passed - 10/19/2021 10:22 PM        Passed - Blood pressure under 140/90 in past 12 months     BP Readings from Last 3 Encounters:   07/30/21 128/76   04/12/21 128/70   02/01/21 128/74                 Passed - Patient is age 6 or older        Passed - Recent (12 mo) or future (30 days) visit within the authorizing provider's specialty     Patient has had an office visit with the authorizing provider or a provider within the authorizing providers department within the previous 12 mos or has a future within next 30 days. See \"Patient Info\" tab in [employer], or " "\"Choose Columns\" in Meds & Orders section of the refill encounter.              Passed - Medication is active on med list           metFORMIN (GLUCOPHAGE) 1000 MG tablet [Pharmacy Med Name: metFORMIN HCl 1000 MG Oral Tablet] 180 tablet 3     Sig: TAKE 1 TABLET BY MOUTH  TWICE DAILY WITH MEALS       Biguanide Agents Passed - 10/19/2021 10:22 PM        Passed - Patient is age 10 or older        Passed - Patient has documented A1c within the specified period of time.     If HgbA1C is 8 or greater, it needs to be on file within the past 3 months.  If less than 8, must be on file within the past 6 months.     Recent Labs   Lab Test 07/30/21  0836   A1C 7.8*             Passed - Patient's CR is NOT>1.4 OR Patient's EGFR is NOT<45 within past 12 mos.     Recent Labs   Lab Test 07/30/21  0836 04/12/21  1115   GFRESTIMATED 57* >60   GFRESTBLACK  --  >60       Recent Labs   Lab Test 07/30/21  0836   CR 1.21             Passed - Patient does NOT have a diagnosis of CHF.        Passed - Medication is active on med list        Passed - Recent (6 mo) or future (30 days) visit within the authorizing provider's specialty     Patient had office visit in the last 6 months or has a visit in the next 30 days with authorizing provider or within the authorizing provider's specialty.  See \"Patient Info\" tab in inbasket, or \"Choose Columns\" in Meds & Orders section of the refill encounter.               glipiZIDE (GLUCOTROL) 10 MG tablet [Pharmacy Med Name: GLIPIZIDE  10MG  TAB] 180 tablet 3     Sig: TAKE 1 TABLET BY MOUTH  TWICE DAILY       Sulfonylurea Agents Passed - 10/19/2021 10:22 PM        Passed - Patient has documented A1c within the specified period of time.     If HgbA1C is 8 or greater, it needs to be on file within the past 3 months.  If less than 8, must be on file within the past 6 months.     Recent Labs   Lab Test 07/30/21  0836   A1C 7.8*             Passed - Medication is active on med list        Passed - Patient is age " "18 or older        Passed - Patient has a recent creatinine (normal) within the past 12 mos.     Recent Labs   Lab Test 07/30/21  0836   CR 1.21       Ok to refill medication if creatinine is low          Passed - Recent (6 mo) or future (30 days) visit within the authorizing provider's specialty     Patient had office visit in the last 6 months or has a visit in the next 30 days with authorizing provider or within the authorizing provider's specialty.  See \"Patient Info\" tab in inbasket, or \"Choose Columns\" in Meds & Orders section of the refill encounter.               simvastatin (ZOCOR) 40 MG tablet [Pharmacy Med Name: Simvastatin 40 MG Oral Tablet] 90 tablet 3     Sig: TAKE 1 TABLET BY MOUTH AT  BEDTIME       Statins Protocol Passed - 10/19/2021 10:22 PM        Passed - LDL on file in past 12 months     Recent Labs   Lab Test 07/30/21  0836   LDL 63             Passed - No abnormal creatine kinase in past 12 months     Recent Labs   Lab Test 11/11/20  0010   CKT 42                Passed - Recent (12 mo) or future (30 days) visit within the authorizing provider's specialty     Patient has had an office visit with the authorizing provider or a provider within the authorizing providers department within the previous 12 mos or has a future within next 30 days. See \"Patient Info\" tab in inbasket, or \"Choose Columns\" in Meds & Orders section of the refill encounter.              Passed - Medication is active on med list        Passed - Patient is age 18 or older             Karen Engel RN 10/20/21 4:47 PM  "

## 2021-10-21 RX ORDER — GLIPIZIDE 10 MG/1
TABLET ORAL
Qty: 180 TABLET | Refills: 3 | Status: SHIPPED | OUTPATIENT
Start: 2021-10-21 | End: 2022-09-26

## 2021-10-21 RX ORDER — ATENOLOL 25 MG/1
TABLET ORAL
Qty: 90 TABLET | Refills: 3 | Status: SHIPPED | OUTPATIENT
Start: 2021-10-21 | End: 2022-09-26

## 2021-10-21 RX ORDER — SIMVASTATIN 40 MG
TABLET ORAL
Qty: 90 TABLET | Refills: 3 | Status: SHIPPED | OUTPATIENT
Start: 2021-10-21 | End: 2022-09-26

## 2021-10-21 RX ORDER — BLOOD SUGAR DIAGNOSTIC
STRIP MISCELLANEOUS
Qty: 300 STRIP | Refills: 3 | Status: SHIPPED | OUTPATIENT
Start: 2021-10-21 | End: 2022-10-25

## 2022-01-07 ENCOUNTER — ALLIED HEALTH/NURSE VISIT (OUTPATIENT)
Dept: FAMILY MEDICINE | Facility: CLINIC | Age: 82
End: 2022-01-07
Payer: COMMERCIAL

## 2022-01-07 DIAGNOSIS — Z20.822 EXPOSURE TO 2019 NOVEL CORONAVIRUS: ICD-10-CM

## 2022-01-07 PROCEDURE — U0003 INFECTIOUS AGENT DETECTION BY NUCLEIC ACID (DNA OR RNA); SEVERE ACUTE RESPIRATORY SYNDROME CORONAVIRUS 2 (SARS-COV-2) (CORONAVIRUS DISEASE [COVID-19]), AMPLIFIED PROBE TECHNIQUE, MAKING USE OF HIGH THROUGHPUT TECHNOLOGIES AS DESCRIBED BY CMS-2020-01-R: HCPCS

## 2022-01-07 PROCEDURE — 99212 OFFICE O/P EST SF 10 MIN: CPT | Mod: TEL

## 2022-01-07 PROCEDURE — U0005 INFEC AGEN DETEC AMPLI PROBE: HCPCS

## 2022-01-08 LAB — SARS-COV-2 RNA RESP QL NAA+PROBE: NEGATIVE

## 2022-02-05 ENCOUNTER — HEALTH MAINTENANCE LETTER (OUTPATIENT)
Age: 82
End: 2022-02-05

## 2022-03-16 DIAGNOSIS — N52.9 ERECTILE DYSFUNCTION, UNSPECIFIED ERECTILE DYSFUNCTION TYPE: Primary | ICD-10-CM

## 2022-03-16 RX ORDER — SILDENAFIL 100 MG/1
TABLET, FILM COATED ORAL
Qty: 30 TABLET | Refills: 6 | Status: SHIPPED | OUTPATIENT
Start: 2022-03-16

## 2022-03-16 NOTE — TELEPHONE ENCOUNTER
"  Outpatient Medication Detail     Disp Refills Start End ZAK   sildenafil (VIAGRA) 100 MG tablet 30 tablet 6 6/24/2019  --   Sig - Route: Take 1 tablet (100 mg total) by mouth as needed for erectile dysfunction. - Oral   Class: Print       sildenafil (VIAGRA) 100 MG tablet [[id number]]    Electronically signed by: Rajesh Lewis MD on 06/24/19 0929 Status: Active   Ordering user: Rajesh Lewis MD 06/24/19 0929 Authorized by: Rajesh Lewis MD   PRN reasons: erectile dysfunction   Frequency: PRN 06/24/19 - Until Discontinued   Diagnoses  Erectile dysfunction, unspecified erectile dysfunction type [N52.9]     Routing refill request to provider for review/approval because:  Drug interaction warning  A break in medication    Last office visit provider:  1/7/22     Requested Prescriptions   Pending Prescriptions Disp Refills     sildenafil (VIAGRA) 100 MG tablet [Pharmacy Med Name: SILDENAFIL CITRATE 100MG TABS] 30 tablet 6     Sig: TAKE 1 TABLET BY MOUTH AS NEEDED       Erectile Dysfuction Protocol Failed - 3/16/2022  3:18 PM        Failed - Absence of Alpha Blockers on Med list        Failed - Medication is active on med list        Passed - Absence of nitrates on medication list        Passed - Recent (12 mo) or future (30 days) visit within the authorizing provider's specialty     Patient has had an office visit with the authorizing provider or a provider within the authorizing providers department within the previous 12 mos or has a future within next 30 days. See \"Patient Info\" tab in inbasket, or \"Choose Columns\" in Meds & Orders section of the refill encounter.              Passed - Patient is age 18 or older             Johnnie Sherman RN 03/16/22 3:18 PM  "

## 2022-03-21 ENCOUNTER — TRANSFERRED RECORDS (OUTPATIENT)
Dept: HEALTH INFORMATION MANAGEMENT | Facility: CLINIC | Age: 82
End: 2022-03-21
Payer: COMMERCIAL

## 2022-03-21 LAB — RETINOPATHY: POSITIVE

## 2022-04-08 ENCOUNTER — IMMUNIZATION (OUTPATIENT)
Dept: NURSING | Facility: CLINIC | Age: 82
End: 2022-04-08
Payer: COMMERCIAL

## 2022-04-08 PROCEDURE — 91305 COVID-19,PF,PFIZER (12+ YRS): CPT

## 2022-04-08 PROCEDURE — 0054A COVID-19,PF,PFIZER (12+ YRS): CPT

## 2022-07-06 ENCOUNTER — TELEPHONE (OUTPATIENT)
Dept: FAMILY MEDICINE | Facility: CLINIC | Age: 82
End: 2022-07-06

## 2022-07-06 DIAGNOSIS — E11.9 TYPE 2 DIABETES MELLITUS WITHOUT COMPLICATION, WITHOUT LONG-TERM CURRENT USE OF INSULIN (H): Primary | ICD-10-CM

## 2022-07-06 DIAGNOSIS — Z12.5 SCREENING FOR PROSTATE CANCER: ICD-10-CM

## 2022-07-06 NOTE — TELEPHONE ENCOUNTER
Reason for Call: Request for an order or referral: Orders    Order or referral being requested: Annual Wellness Fasting Labs    Date needed: Before 8/26/22    Has the patient been seen by the PCP for this problem? Yes    Additional comments: Pt has annual wellness on 8/26/22    Phone number Patient can be reached at:  [phone]    Best Time:  Anytime     Can we leave a detailed message on this number?  Yes    Call taken on 7/6/2022 at 2:14 PM by Carlos Berger

## 2022-07-21 DIAGNOSIS — E78.5 HYPERLIPIDEMIA: ICD-10-CM

## 2022-07-21 DIAGNOSIS — E11.9 TYPE 2 DIABETES MELLITUS (H): ICD-10-CM

## 2022-07-21 DIAGNOSIS — I10 HTN (HYPERTENSION): ICD-10-CM

## 2022-07-21 DIAGNOSIS — E11.9 DIABETES MELLITUS, TYPE 2 (H): ICD-10-CM

## 2022-07-23 RX ORDER — GLIPIZIDE 10 MG/1
TABLET ORAL
Qty: 200 TABLET | Refills: 2 | OUTPATIENT
Start: 2022-07-23

## 2022-07-23 RX ORDER — SIMVASTATIN 40 MG
TABLET ORAL
Qty: 100 TABLET | Refills: 2 | OUTPATIENT
Start: 2022-07-23

## 2022-07-23 RX ORDER — ATENOLOL 25 MG/1
TABLET ORAL
Qty: 100 TABLET | Refills: 2 | OUTPATIENT
Start: 2022-07-23

## 2022-08-18 ENCOUNTER — LAB (OUTPATIENT)
Dept: LAB | Facility: CLINIC | Age: 82
End: 2022-08-18
Payer: COMMERCIAL

## 2022-08-18 DIAGNOSIS — E11.9 TYPE 2 DIABETES MELLITUS WITHOUT COMPLICATION, WITHOUT LONG-TERM CURRENT USE OF INSULIN (H): ICD-10-CM

## 2022-08-18 DIAGNOSIS — Z12.5 SCREENING FOR PROSTATE CANCER: ICD-10-CM

## 2022-08-18 LAB
ALBUMIN SERPL BCG-MCNC: 4.5 G/DL (ref 3.5–5.2)
ALP SERPL-CCNC: 79 U/L (ref 40–129)
ALT SERPL W P-5'-P-CCNC: 20 U/L (ref 10–50)
ANION GAP SERPL CALCULATED.3IONS-SCNC: 13 MMOL/L (ref 7–15)
AST SERPL W P-5'-P-CCNC: 22 U/L (ref 10–50)
BILIRUB SERPL-MCNC: 0.7 MG/DL
BUN SERPL-MCNC: 15 MG/DL (ref 8–23)
CALCIUM SERPL-MCNC: 10.3 MG/DL (ref 8.8–10.2)
CHLORIDE SERPL-SCNC: 103 MMOL/L (ref 98–107)
CREAT SERPL-MCNC: 1.27 MG/DL (ref 0.67–1.17)
DEPRECATED HCO3 PLAS-SCNC: 24 MMOL/L (ref 22–29)
ERYTHROCYTE [DISTWIDTH] IN BLOOD BY AUTOMATED COUNT: 13.5 % (ref 10–15)
GFR SERPL CREATININE-BSD FRML MDRD: 57 ML/MIN/1.73M2
GLUCOSE SERPL-MCNC: 284 MG/DL (ref 70–99)
HBA1C MFR BLD: 8.7 % (ref 0–5.6)
HCT VFR BLD AUTO: 39.6 % (ref 40–53)
HGB BLD-MCNC: 14.2 G/DL (ref 13.3–17.7)
MCH RBC QN AUTO: 33 PG (ref 26.5–33)
MCHC RBC AUTO-ENTMCNC: 35.9 G/DL (ref 31.5–36.5)
MCV RBC AUTO: 92 FL (ref 78–100)
PLATELET # BLD AUTO: 178 10E3/UL (ref 150–450)
POTASSIUM SERPL-SCNC: 5.8 MMOL/L (ref 3.4–5.3)
PROT SERPL-MCNC: 7 G/DL (ref 6.4–8.3)
PSA SERPL-MCNC: 1.25 NG/ML
RBC # BLD AUTO: 4.3 10E6/UL (ref 4.4–5.9)
SODIUM SERPL-SCNC: 140 MMOL/L (ref 136–145)
WBC # BLD AUTO: 7.4 10E3/UL (ref 4–11)

## 2022-08-18 PROCEDURE — 85027 COMPLETE CBC AUTOMATED: CPT

## 2022-08-18 PROCEDURE — 80053 COMPREHEN METABOLIC PANEL: CPT

## 2022-08-18 PROCEDURE — 83036 HEMOGLOBIN GLYCOSYLATED A1C: CPT

## 2022-08-18 PROCEDURE — G0103 PSA SCREENING: HCPCS

## 2022-08-18 PROCEDURE — 36415 COLL VENOUS BLD VENIPUNCTURE: CPT

## 2022-08-23 ENCOUNTER — LAB (OUTPATIENT)
Dept: LAB | Facility: CLINIC | Age: 82
End: 2022-08-23
Payer: COMMERCIAL

## 2022-08-23 DIAGNOSIS — E11.9 TYPE 2 DIABETES MELLITUS WITHOUT COMPLICATION, WITHOUT LONG-TERM CURRENT USE OF INSULIN (H): ICD-10-CM

## 2022-08-23 LAB
CREAT UR-MCNC: 71.7 MG/DL
MICROALBUMIN UR-MCNC: 28.2 MG/L
MICROALBUMIN/CREAT UR: 39.33 MG/G CR (ref 0–17)

## 2022-08-23 PROCEDURE — 82043 UR ALBUMIN QUANTITATIVE: CPT

## 2022-08-26 ENCOUNTER — OFFICE VISIT (OUTPATIENT)
Dept: FAMILY MEDICINE | Facility: CLINIC | Age: 82
End: 2022-08-26
Payer: COMMERCIAL

## 2022-08-26 VITALS
WEIGHT: 179 LBS | OXYGEN SATURATION: 98 % | SYSTOLIC BLOOD PRESSURE: 128 MMHG | HEIGHT: 69 IN | HEART RATE: 66 BPM | DIASTOLIC BLOOD PRESSURE: 76 MMHG | TEMPERATURE: 98 F | RESPIRATION RATE: 18 BRPM | BODY MASS INDEX: 26.51 KG/M2

## 2022-08-26 DIAGNOSIS — N40.1 BPH WITH OBSTRUCTION/LOWER URINARY TRACT SYMPTOMS: ICD-10-CM

## 2022-08-26 DIAGNOSIS — Z00.01 ENCOUNTER FOR ROUTINE ADULT MEDICAL EXAM WITH ABNORMAL FINDINGS: ICD-10-CM

## 2022-08-26 DIAGNOSIS — E78.5 HYPERLIPIDEMIA, UNSPECIFIED HYPERLIPIDEMIA TYPE: ICD-10-CM

## 2022-08-26 DIAGNOSIS — R80.9 TYPE 2 DIABETES MELLITUS WITH MICROALBUMINURIA, WITHOUT LONG-TERM CURRENT USE OF INSULIN (H): ICD-10-CM

## 2022-08-26 DIAGNOSIS — Z12.5 SCREENING FOR PROSTATE CANCER: ICD-10-CM

## 2022-08-26 DIAGNOSIS — N13.8 BPH WITH OBSTRUCTION/LOWER URINARY TRACT SYMPTOMS: ICD-10-CM

## 2022-08-26 DIAGNOSIS — N52.9 ERECTILE DYSFUNCTION, UNSPECIFIED ERECTILE DYSFUNCTION TYPE: ICD-10-CM

## 2022-08-26 DIAGNOSIS — E11.29 TYPE 2 DIABETES MELLITUS WITH MICROALBUMINURIA, WITHOUT LONG-TERM CURRENT USE OF INSULIN (H): ICD-10-CM

## 2022-08-26 DIAGNOSIS — E87.5 HYPERKALEMIA: Primary | ICD-10-CM

## 2022-08-26 LAB
ANION GAP SERPL CALCULATED.3IONS-SCNC: 11 MMOL/L (ref 7–15)
BUN SERPL-MCNC: 13.1 MG/DL (ref 8–23)
CALCIUM SERPL-MCNC: 10.2 MG/DL (ref 8.8–10.2)
CHLORIDE SERPL-SCNC: 100 MMOL/L (ref 98–107)
CREAT SERPL-MCNC: 1.13 MG/DL (ref 0.67–1.17)
DEPRECATED HCO3 PLAS-SCNC: 23 MMOL/L (ref 22–29)
GFR SERPL CREATININE-BSD FRML MDRD: 66 ML/MIN/1.73M2
GLUCOSE SERPL-MCNC: 267 MG/DL (ref 70–99)
POTASSIUM SERPL-SCNC: 5.5 MMOL/L (ref 3.4–5.3)
SODIUM SERPL-SCNC: 134 MMOL/L (ref 136–145)

## 2022-08-26 PROCEDURE — G0439 PPPS, SUBSEQ VISIT: HCPCS | Performed by: FAMILY MEDICINE

## 2022-08-26 PROCEDURE — 36415 COLL VENOUS BLD VENIPUNCTURE: CPT | Performed by: FAMILY MEDICINE

## 2022-08-26 PROCEDURE — 80048 BASIC METABOLIC PNL TOTAL CA: CPT | Performed by: FAMILY MEDICINE

## 2022-08-26 ASSESSMENT — ENCOUNTER SYMPTOMS
JOINT SWELLING: 0
ARTHRALGIAS: 1
HEARTBURN: 0
FREQUENCY: 1
ABDOMINAL PAIN: 0
DIARRHEA: 0
NERVOUS/ANXIOUS: 0
DYSURIA: 0
PALPITATIONS: 0
WEAKNESS: 0
NAUSEA: 0
DIZZINESS: 0
EYE PAIN: 0
PARESTHESIAS: 0
MYALGIAS: 0
COUGH: 0
CONSTIPATION: 0
FEVER: 0
HEADACHES: 0
CHILLS: 0
SHORTNESS OF BREATH: 0
HEMATURIA: 0
SORE THROAT: 0
HEMATOCHEZIA: 0

## 2022-08-26 ASSESSMENT — ACTIVITIES OF DAILY LIVING (ADL): CURRENT_FUNCTION: NO ASSISTANCE NEEDED

## 2022-08-26 ASSESSMENT — PAIN SCALES - GENERAL: PAINLEVEL: NO PAIN (0)

## 2022-08-26 NOTE — PROGRESS NOTES
SUBJECTIVE:   Dio Curran is a 82 year old male who presents for Preventive Visit.    He has type 2 diabetes.  Currently on metformin and glipizide.  A1c has increased.  Patient admits to being less active more recently than usual.  He does drink beer regularly.  Discussed how alcohol may be a contributing factor to increased blood sugars.  Seems to drink on average about 2/day but sometimes more sometimes has other forms of liquor as well.  Discussed briefly did not identify as a significant problem but felt that he should be diligent to not exceed 2 drinks on average per day.  Discussed potential changes to medication he elects not to add any medication to the regiment at this time.    He has a history of neuropathy but no current complaints and a normal foot exam today.  He has a history of microalbuminuria.  Due to tendency toward hyperkalemia with otherwise normal blood pressure we have held off on initiation of ACE or ARB.  We will continue to monitor currently.  Microalbumin level has been steady to slightly decreased over time.  Kidney function measurements otherwise have been normal.  On his current most recent lab tests obtained prior to his visit today potassium is high as his calcium will recheck prior to any intervention.    He does have BPH with nocturia is on tamsulosin.  Notes improvement in urine flow but no substantial reduction in overall symptoms.  Discussed increasing dose to 2 capsules daily.    He has had some musculoskeletal issues including left-sided shoulder pain and pain in the right hand.  He has found more conservative ways of managing these issues and they seem to be improving we elected not to take any other specific action today on these considerations.    He has erectile dysfunction sildenafil is minimally to not helpful.  Does note some side effects with the 100 mg dose.  He will forego further medication treatment for the time being.    Discussed continued use of  aspirin.    Discussed prostate cancer screening.    Reviewed and discussed immunizations.      Patient has been advised of split billing requirements and indicates understanding: Yes  Are you in the first 12 months of your Medicare coverage?  No    HPI  Do you feel safe in your environment? Yes    Have you ever done Advance Care Planning? (For example, a Health Directive, POLST, or a discussion with a medical provider or your loved ones about your wishes): Yes, patient states has an Advance Care Planning document and will bring a copy to the clinic.       Fall risk  Fallen 2 or more times in the past year?: No  Any fall with injury in the past year?: No    Cognitive Screening   1) Repeat 3 items (Leader, Season, Table)    2) Clock draw: NORMAL  3) 3 item recall: Recalls 3 objects  Results: 3 items recalled: COGNITIVE IMPAIRMENT LESS LIKELY    Mini-CogTM Copyright S Maurizio. Licensed by the author for use in [facility]; reprinted with permission ([place]@[facility]). All rights reserved.      Do you have sleep apnea, excessive snoring or daytime drowsiness?: no    Reviewed and updated as needed this visit by clinical staff   Tobacco   Meds   Med Hx  Surg Hx  Fam Hx            Reviewed and updated as needed this visit by Provider                   Social History     Tobacco Use     Smoking status: Former Smoker     Types: Cigarettes, Cigars     Smokeless tobacco: Never Used     Tobacco comment: cigars once in a while   Substance Use Topics     Alcohol use: Yes         Alcohol Use 8/26/2022   Prescreen: >3 drinks/day or >7 drinks/week? No               Current providers sharing in care for this patient include:   Patient Care Team:  Rajesh Lewis MD as PCP - General  Rajesh Lewis MD as Assigned PCP    The following health maintenance items are reviewed in Epic and correct as of today:  Health Maintenance Due   Topic Date Due     DIABETIC FOOT EXAM  Never done     ANNUAL REVIEW OF HM ORDERS  Never  "done     LIPID  07/30/2022     INFLUENZA VACCINE (1) 09/01/2022               Review of Systems  Complete review of systems is obtained.  Other than the specific considerations noted above complete review of systems is negative.      OBJECTIVE:   /76   Pulse 66   Temp 98  F (36.7  C)   Resp 18   Ht 1.753 m (5' 9\")   Wt 81.2 kg (179 lb)   SpO2 98%   BMI 26.43 kg/m   Estimated body mass index is 26.43 kg/m  as calculated from the following:    Height as of this encounter: 1.753 m (5' 9\").    Weight as of this encounter: 81.2 kg (179 lb).  Physical Exam          General Appearance:    Alert, cooperative, no distress   Eyes:   No scleral icterus or conjunctival irritation       Ears:    Normal TM's and external ear canals, both ears   Throat:   Lips, mucosa, and tongue normal; teeth and gums normal   Neck:   Supple, symmetrical, trachea midline, no adenopathy;        thyroid:  No enlargement/tenderness/nodules   Lungs:     Clear to auscultation bilaterally, respirations unlabored, no wheezesor crackles   Heart:    Regular rate and rhythm,  No murmur   Abdomen:    Soft, no distention, no tenderness on palpation, no masses, no organomegaly     Extremities:  No edema, no jointswelling or redness, no evidence of any injuries, palpable dorsalis pedis pulses.  No ulcerations.  No significant callus formation.  No other foot deformities.   Skin:  Noconcerning skin findings, no suspicious moles, no rashes   Neurologic:  On gross examination there is no motor or sensory deficit.  Specific examination of the feet using the monofilament line revealsthat there is no absence of sensation.  Patient walks with a normal gait                 ASSESSMENT / PLAN:   Dio was seen today for recheck medication and wellness visit.    Diagnoses and all orders for this visit:    Hyperkalemia  -     Basic metabolic panel    Type 2 diabetes mellitus with microalbuminuria, without long-term current use of insulin " "(H)    Hyperlipidemia, unspecified hyperlipidemia type    Screening for prostate cancer    Erectile dysfunction, unspecified erectile dysfunction type    BPH with obstruction/lower urinary tract symptoms    Encounter for routine adult medical exam with abnormal findings           Patient has been advised of split billing requirements and indicates understanding: Yes    COUNSELING:  Reviewed preventive health counseling, as reflected in patient instructions       Regular exercise       Healthy diet/nutrition       Vision screening       Dental care       Aspirin prophylaxis        Colon cancer screening       Prostate cancer screening    Estimated body mass index is 26.43 kg/m  as calculated from the following:    Height as of this encounter: 1.753 m (5' 9\").    Weight as of this encounter: 81.2 kg (179 lb).        He reports that he has quit smoking. His smoking use included cigarettes and cigars. He has never used smokeless tobacco.      Appropriate preventive services were discussed with this patient, including applicable screening as appropriate for cardiovascular disease, diabetes, osteopenia/osteoporosis, and glaucoma.  As appropriate for age/gender, discussed screening for colorectal cancer, prostate cancer, breast cancer, and cervical cancer. Checklist reviewing preventive services available has been given to the patient.    Reviewed patients plan of care and provided an AVS. The Basic Care Plan (routine screening as documented in Health Maintenance) for Dio meets the Care Plan requirement. This Care Plan has been established and reviewed with the Patient.    Counseling Resources:  ATP IV Guidelines  Pooled Cohorts Equation Calculator  Breast Cancer Risk Calculator  Breast Cancer: Medication to Reduce Risk  FRAX Risk Assessment  ICSI Preventive Guidelines  Dietary Guidelines for Americans, 2010  [employer]'s MyPlate  ASA Prophylaxis  Lung CA Screening    Rajesh Lewis MD, MD  [facility] " [place]    Identified Health Risks:

## 2022-08-29 ENCOUNTER — TELEPHONE (OUTPATIENT)
Dept: FAMILY MEDICINE | Facility: CLINIC | Age: 82
End: 2022-08-29

## 2022-08-29 DIAGNOSIS — E78.5 HYPERLIPIDEMIA, UNSPECIFIED HYPERLIPIDEMIA TYPE: Primary | ICD-10-CM

## 2022-08-29 NOTE — TELEPHONE ENCOUNTER
Pt called stating he was seen last week and his lipid panel was not drawn. Wondering if he can come in for a lab only apt to have this done.

## 2022-08-29 NOTE — TELEPHONE ENCOUNTER
Lab appt okay;  Please assist patient in scheduling a lab only for a fasting lipid test when he calls back.

## 2022-10-01 ENCOUNTER — HEALTH MAINTENANCE LETTER (OUTPATIENT)
Age: 82
End: 2022-10-01

## 2022-10-24 DIAGNOSIS — E11.9 DIABETES MELLITUS, TYPE 2 (H): ICD-10-CM

## 2022-10-25 RX ORDER — BLOOD SUGAR DIAGNOSTIC
STRIP MISCELLANEOUS
Qty: 300 STRIP | Refills: 0 | Status: SHIPPED | OUTPATIENT
Start: 2022-10-25 | End: 2023-01-28

## 2023-01-27 DIAGNOSIS — E11.9 DIABETES MELLITUS, TYPE 2 (H): ICD-10-CM

## 2023-01-28 RX ORDER — BLOOD SUGAR DIAGNOSTIC
STRIP MISCELLANEOUS
Qty: 300 STRIP | Refills: 0 | Status: SHIPPED | OUTPATIENT
Start: 2023-01-28 | End: 2023-04-28

## 2023-01-29 NOTE — TELEPHONE ENCOUNTER
"Last Written Prescription Date:  10/25/22  Last Fill Quantity: 300,  # refills: 0   Last office visit provider:  8/26/22     Requested Prescriptions   Pending Prescriptions Disp Refills     ACCU-CHEK PATRICIA PLUS test strip [Pharmacy Med Name: Accu-Chek Patricia Plus In Vitro Strip] 300 strip 2     Sig: USE TO CHECK BLOOD SUGAR 3 TIMES DAILY FOLLOW MANUFACTURES  DIRECTIONS FOR USE       Diabetic Supplies Protocol Passed - 1/27/2023  9:28 PM        Passed - Medication is active on med list        Passed - Patient is 18 years of age or older        Passed - Recent (6 mo) or future (30 days) visit within the authorizing provider's specialty     Patient had office visit in the last 6 months or has a visit in the next 30 days with authorizing provider.  See \"Patient Info\" tab in inbasket, or \"Choose Columns\" in Meds & Orders section of the refill encounter.                 Yasmeen Govea RN 01/28/23 9:40 PM  "

## 2023-03-21 ENCOUNTER — TRANSFERRED RECORDS (OUTPATIENT)
Dept: HEALTH INFORMATION MANAGEMENT | Facility: CLINIC | Age: 83
End: 2023-03-21

## 2023-03-21 LAB — RETINOPATHY: POSITIVE

## 2023-05-14 ENCOUNTER — HEALTH MAINTENANCE LETTER (OUTPATIENT)
Age: 83
End: 2023-05-14

## 2023-07-07 DIAGNOSIS — E11.9 DIABETES MELLITUS, TYPE 2 (H): ICD-10-CM

## 2023-07-07 DIAGNOSIS — E78.5 HYPERLIPIDEMIA: ICD-10-CM

## 2023-07-07 DIAGNOSIS — E11.9 TYPE 2 DIABETES MELLITUS (H): ICD-10-CM

## 2023-07-07 DIAGNOSIS — I10 HTN (HYPERTENSION): ICD-10-CM

## 2023-07-08 NOTE — TELEPHONE ENCOUNTER
"Routing refill request to provider for review/approval because:  Labs not current:  a1c    Last Written Prescription Date:  9/26/22  Last Fill Quantity: 180,  # refills: 3   Last office visit provider:  8/26/22     Requested Prescriptions   Pending Prescriptions Disp Refills     metFORMIN (GLUCOPHAGE) 1000 MG tablet [Pharmacy Med Name: metFORMIN HCl 1000 MG Oral Tablet] 200 tablet 2     Sig: TAKE 1 TABLET BY MOUTH  TWICE DAILY WITH MEALS       Biguanide Agents Failed - 7/7/2023  9:26 PM        Failed - Patient has documented A1c within the specified period of time.     If HgbA1C is 8 or greater, it needs to be on file within the past 3 months.  If less than 8, must be on file within the past 6 months.     Recent Labs   Lab Test 08/31/22  0807   A1C 8.7*             Failed - Recent (6 mo) or future (30 days) visit within the authorizing provider's specialty     Patient had office visit in the last 6 months or has a visit in the next 30 days with authorizing provider or within the authorizing provider's specialty.  See \"Patient Info\" tab in inbasket, or \"Choose Columns\" in Meds & Orders section of the refill encounter.            Passed - Patient is age 10 or older        Passed - Patient's CR is NOT>1.4 OR Patient's EGFR is NOT<45 within past 12 mos.     Recent Labs   Lab Test 08/26/22  1037 07/30/21  0836 04/12/21  1115   GFRESTIMATED 66   < > >60   GFRESTBLACK  --   --  >60    < > = values in this interval not displayed.       Recent Labs   Lab Test 08/26/22  1037   CR 1.13             Passed - Patient does NOT have a diagnosis of CHF.        Passed - Medication is active on med list           simvastatin (ZOCOR) 40 MG tablet [Pharmacy Med Name: Simvastatin 40 MG Oral Tablet] 100 tablet 2     Sig: TAKE 1 TABLET BY MOUTH AT  BEDTIME       Statins Protocol Passed - 7/7/2023  9:26 PM        Passed - LDL on file in past 12 months     Recent Labs   Lab Test 08/31/22  0807   LDL 56             Passed - No abnormal " "creatine kinase in past 12 months     Recent Labs   Lab Test 11/11/20  0010   CKT 42                Passed - Recent (12 mo) or future (30 days) visit within the authorizing provider's specialty     Patient has had an office visit with the authorizing provider or a provider within the authorizing providers department within the previous 12 mos or has a future within next 30 days. See \"Patient Info\" tab in inbasket, or \"Choose Columns\" in Meds & Orders section of the refill encounter.              Passed - Medication is active on med list        Passed - Patient is age 18 or older           glipiZIDE (GLUCOTROL) 10 MG tablet [Pharmacy Med Name: glipiZIDE 10 MG Oral Tablet] 200 tablet 2     Sig: TAKE 1 TABLET BY MOUTH  TWICE DAILY       Sulfonylurea Agents Failed - 7/7/2023  9:26 PM        Failed - Patient has documented A1c within the specified period of time.     If HgbA1C is 8 or greater, it needs to be on file within the past 3 months.  If less than 8, must be on file within the past 6 months.     Recent Labs   Lab Test 08/31/22  0807   A1C 8.7*             Failed - Recent (6 mo) or future (30 days) visit within the authorizing provider's specialty     Patient had office visit in the last 6 months or has a visit in the next 30 days with authorizing provider or within the authorizing provider's specialty.  See \"Patient Info\" tab in inbasket, or \"Choose Columns\" in Meds & Orders section of the refill encounter.            Passed - Medication is active on med list        Passed - Patient is age 18 or older        Passed - Patient has a recent creatinine (normal) within the past 12 mos.     Recent Labs   Lab Test 08/26/22  1037   CR 1.13       Ok to refill medication if creatinine is low             atenolol (TENORMIN) 25 MG tablet [Pharmacy Med Name: Atenolol 25 MG Oral Tablet] 100 tablet 2     Sig: TAKE 1 TABLET BY MOUTH  DAILY       Beta-Blockers Protocol Passed - 7/7/2023  9:26 PM        Passed - Blood pressure " "under 140/90 in past 12 months     BP Readings from Last 3 Encounters:   08/26/22 128/76   07/30/21 128/76   04/12/21 128/70                 Passed - Patient is age 6 or older        Passed - Recent (12 mo) or future (30 days) visit within the authorizing provider's specialty     Patient has had an office visit with the authorizing provider or a provider within the authorizing providers department within the previous 12 mos or has a future within next 30 days. See \"Patient Info\" tab in inbasket, or \"Choose Columns\" in Meds & Orders section of the refill encounter.              Passed - Medication is active on med list             Yasmeen Govea RN 07/08/23 10:21 AM  "

## 2023-07-10 RX ORDER — ATENOLOL 25 MG/1
TABLET ORAL
Qty: 100 TABLET | Refills: 2 | Status: SHIPPED | OUTPATIENT
Start: 2023-07-10 | End: 2024-03-26

## 2023-07-10 RX ORDER — GLIPIZIDE 10 MG/1
TABLET ORAL
Qty: 200 TABLET | Refills: 2 | Status: SHIPPED | OUTPATIENT
Start: 2023-07-10 | End: 2024-03-26

## 2023-07-10 RX ORDER — SIMVASTATIN 40 MG
TABLET ORAL
Qty: 100 TABLET | Refills: 2 | Status: SHIPPED | OUTPATIENT
Start: 2023-07-10 | End: 2024-03-26

## 2023-07-27 ENCOUNTER — PATIENT OUTREACH (OUTPATIENT)
Dept: CARE COORDINATION | Facility: CLINIC | Age: 83
End: 2023-07-27
Payer: COMMERCIAL

## 2023-08-10 ENCOUNTER — PATIENT OUTREACH (OUTPATIENT)
Dept: CARE COORDINATION | Facility: CLINIC | Age: 83
End: 2023-08-10
Payer: COMMERCIAL

## 2023-09-01 DIAGNOSIS — N40.1 BPH WITH OBSTRUCTION/LOWER URINARY TRACT SYMPTOMS: ICD-10-CM

## 2023-09-01 DIAGNOSIS — N13.8 BPH WITH OBSTRUCTION/LOWER URINARY TRACT SYMPTOMS: ICD-10-CM

## 2023-09-01 RX ORDER — TAMSULOSIN HYDROCHLORIDE 0.4 MG/1
CAPSULE ORAL
Qty: 90 CAPSULE | Refills: 3 | Status: SHIPPED | OUTPATIENT
Start: 2023-09-01 | End: 2023-10-20

## 2023-09-01 NOTE — TELEPHONE ENCOUNTER
"Routing refill request to provider for review/approval because:  Drug not active on patient's medication list  Labs not current:  BP  Patient needs to be seen because it has been more than 1 year since last office visit.    Last Written Prescription Date:  10/10/22  Last Fill Quantity: 90,  # refills: 3   Last office visit provider:  8/26/22     Requested Prescriptions   Pending Prescriptions Disp Refills    tamsulosin (FLOMAX) 0.4 MG capsule [Pharmacy Med Name: Tamsulosin HCl Oral Capsule 0.4 MG] 90 capsule 0     Sig: Take 1 capsule by mouth every evening.       Alpha Blockers Failed - 9/1/2023  8:45 AM        Failed - Blood pressure under 140/90 in past 12 months     BP Readings from Last 3 Encounters:   08/26/22 128/76   07/30/21 128/76   04/12/21 128/70                 Failed - Recent (12 mo) or future (30 days) visit within the authorizing provider's specialty     Patient has had an office visit with the authorizing provider or a provider within the authorizing providers department within the previous 12 mos or has a future within next 30 days. See \"Patient Info\" tab in inbasket, or \"Choose Columns\" in Meds & Orders section of the refill encounter.              Failed - Patient does not have Tadalafil, Vardenafil, or Sildenafil on their medication list        Passed - Medication is active on med list        Passed - Patient is 18 years of age or older             Anika Maddox 09/01/23 12:15 PM  "

## 2023-09-22 ENCOUNTER — TELEPHONE (OUTPATIENT)
Dept: FAMILY MEDICINE | Facility: CLINIC | Age: 83
End: 2023-09-22
Payer: COMMERCIAL

## 2023-09-22 DIAGNOSIS — R80.9 TYPE 2 DIABETES MELLITUS WITH MICROALBUMINURIA, WITHOUT LONG-TERM CURRENT USE OF INSULIN (H): Primary | ICD-10-CM

## 2023-09-22 DIAGNOSIS — E78.5 HYPERLIPIDEMIA, UNSPECIFIED HYPERLIPIDEMIA TYPE: ICD-10-CM

## 2023-09-22 DIAGNOSIS — Z12.5 SCREENING FOR PROSTATE CANCER: ICD-10-CM

## 2023-09-22 DIAGNOSIS — E11.29 TYPE 2 DIABETES MELLITUS WITH MICROALBUMINURIA, WITHOUT LONG-TERM CURRENT USE OF INSULIN (H): Primary | ICD-10-CM

## 2023-09-22 DIAGNOSIS — I10 PRIMARY HYPERTENSION: ICD-10-CM

## 2023-10-13 ENCOUNTER — LAB (OUTPATIENT)
Dept: LAB | Facility: CLINIC | Age: 83
End: 2023-10-13
Payer: COMMERCIAL

## 2023-10-13 DIAGNOSIS — Z12.5 SCREENING FOR PROSTATE CANCER: ICD-10-CM

## 2023-10-13 DIAGNOSIS — E78.5 HYPERLIPIDEMIA, UNSPECIFIED HYPERLIPIDEMIA TYPE: ICD-10-CM

## 2023-10-13 DIAGNOSIS — I10 PRIMARY HYPERTENSION: ICD-10-CM

## 2023-10-13 DIAGNOSIS — E11.29 TYPE 2 DIABETES MELLITUS WITH MICROALBUMINURIA, WITHOUT LONG-TERM CURRENT USE OF INSULIN (H): ICD-10-CM

## 2023-10-13 DIAGNOSIS — R80.9 TYPE 2 DIABETES MELLITUS WITH MICROALBUMINURIA, WITHOUT LONG-TERM CURRENT USE OF INSULIN (H): ICD-10-CM

## 2023-10-13 LAB
ALBUMIN SERPL BCG-MCNC: 4.5 G/DL (ref 3.5–5.2)
ALP SERPL-CCNC: 83 U/L (ref 40–129)
ALT SERPL W P-5'-P-CCNC: 20 U/L (ref 0–70)
ANION GAP SERPL CALCULATED.3IONS-SCNC: 11 MMOL/L (ref 7–15)
AST SERPL W P-5'-P-CCNC: 17 U/L (ref 0–45)
BILIRUB SERPL-MCNC: 0.8 MG/DL
BUN SERPL-MCNC: 13.1 MG/DL (ref 8–23)
CALCIUM SERPL-MCNC: 10 MG/DL (ref 8.8–10.2)
CHLORIDE SERPL-SCNC: 101 MMOL/L (ref 98–107)
CHOLEST SERPL-MCNC: 114 MG/DL
CREAT SERPL-MCNC: 1.16 MG/DL (ref 0.67–1.17)
CREAT UR-MCNC: 18.6 MG/DL
DEPRECATED HCO3 PLAS-SCNC: 26 MMOL/L (ref 22–29)
EGFRCR SERPLBLD CKD-EPI 2021: 63 ML/MIN/1.73M2
ERYTHROCYTE [DISTWIDTH] IN BLOOD BY AUTOMATED COUNT: 13.5 % (ref 10–15)
GLUCOSE SERPL-MCNC: 225 MG/DL (ref 70–99)
HBA1C MFR BLD: 7.7 % (ref 0–5.6)
HCT VFR BLD AUTO: 39.6 % (ref 40–53)
HDLC SERPL-MCNC: 43 MG/DL
HGB BLD-MCNC: 14.2 G/DL (ref 13.3–17.7)
LDLC SERPL CALC-MCNC: 51 MG/DL
MCH RBC QN AUTO: 32.9 PG (ref 26.5–33)
MCHC RBC AUTO-ENTMCNC: 35.9 G/DL (ref 31.5–36.5)
MCV RBC AUTO: 92 FL (ref 78–100)
MICROALBUMIN UR-MCNC: 28.4 MG/L
MICROALBUMIN/CREAT UR: 152.69 MG/G CR (ref 0–17)
NONHDLC SERPL-MCNC: 71 MG/DL
PLATELET # BLD AUTO: 189 10E3/UL (ref 150–450)
POTASSIUM SERPL-SCNC: 5.3 MMOL/L (ref 3.4–5.3)
PROT SERPL-MCNC: 7 G/DL (ref 6.4–8.3)
PSA SERPL DL<=0.01 NG/ML-MCNC: 0.98 NG/ML
RBC # BLD AUTO: 4.32 10E6/UL (ref 4.4–5.9)
SODIUM SERPL-SCNC: 138 MMOL/L (ref 135–145)
TRIGL SERPL-MCNC: 100 MG/DL
WBC # BLD AUTO: 7.3 10E3/UL (ref 4–11)

## 2023-10-13 PROCEDURE — 36415 COLL VENOUS BLD VENIPUNCTURE: CPT

## 2023-10-13 PROCEDURE — G0103 PSA SCREENING: HCPCS

## 2023-10-13 PROCEDURE — 80061 LIPID PANEL: CPT

## 2023-10-13 PROCEDURE — 83036 HEMOGLOBIN GLYCOSYLATED A1C: CPT

## 2023-10-13 PROCEDURE — 82043 UR ALBUMIN QUANTITATIVE: CPT

## 2023-10-13 PROCEDURE — 82570 ASSAY OF URINE CREATININE: CPT

## 2023-10-13 PROCEDURE — 80053 COMPREHEN METABOLIC PANEL: CPT

## 2023-10-13 PROCEDURE — 85027 COMPLETE CBC AUTOMATED: CPT

## 2023-10-15 ENCOUNTER — HEALTH MAINTENANCE LETTER (OUTPATIENT)
Age: 83
End: 2023-10-15

## 2023-10-17 PROBLEM — J12.9 VIRAL PNEUMONIA, UNSPECIFIED: Status: ACTIVE | Noted: 2020-11-13

## 2023-10-17 PROBLEM — E83.42 HYPOMAGNESEMIA: Status: ACTIVE | Noted: 2020-11-13

## 2023-10-17 PROBLEM — E11.9 TYPE 2 DIABETES MELLITUS WITHOUT COMPLICATIONS (H): Status: ACTIVE | Noted: 2020-11-13

## 2023-10-20 ENCOUNTER — OFFICE VISIT (OUTPATIENT)
Dept: FAMILY MEDICINE | Facility: CLINIC | Age: 83
End: 2023-10-20
Payer: COMMERCIAL

## 2023-10-20 VITALS
RESPIRATION RATE: 18 BRPM | DIASTOLIC BLOOD PRESSURE: 76 MMHG | WEIGHT: 177 LBS | HEART RATE: 63 BPM | HEIGHT: 69 IN | OXYGEN SATURATION: 98 % | TEMPERATURE: 98.6 F | SYSTOLIC BLOOD PRESSURE: 136 MMHG | BODY MASS INDEX: 26.22 KG/M2

## 2023-10-20 DIAGNOSIS — E11.29 TYPE 2 DIABETES MELLITUS WITH MICROALBUMINURIA, WITHOUT LONG-TERM CURRENT USE OF INSULIN (H): ICD-10-CM

## 2023-10-20 DIAGNOSIS — N40.1 BPH WITH OBSTRUCTION/LOWER URINARY TRACT SYMPTOMS: ICD-10-CM

## 2023-10-20 DIAGNOSIS — E78.5 HYPERLIPIDEMIA, UNSPECIFIED HYPERLIPIDEMIA TYPE: ICD-10-CM

## 2023-10-20 DIAGNOSIS — R80.9 TYPE 2 DIABETES MELLITUS WITH MICROALBUMINURIA, WITHOUT LONG-TERM CURRENT USE OF INSULIN (H): ICD-10-CM

## 2023-10-20 DIAGNOSIS — Z12.5 SCREENING FOR PROSTATE CANCER: ICD-10-CM

## 2023-10-20 DIAGNOSIS — N52.9 ERECTILE DYSFUNCTION, UNSPECIFIED ERECTILE DYSFUNCTION TYPE: ICD-10-CM

## 2023-10-20 DIAGNOSIS — Z00.00 MEDICARE ANNUAL WELLNESS VISIT, SUBSEQUENT: Primary | ICD-10-CM

## 2023-10-20 DIAGNOSIS — I10 PRIMARY HYPERTENSION: ICD-10-CM

## 2023-10-20 DIAGNOSIS — N13.8 BPH WITH OBSTRUCTION/LOWER URINARY TRACT SYMPTOMS: ICD-10-CM

## 2023-10-20 PROCEDURE — 99214 OFFICE O/P EST MOD 30 MIN: CPT | Mod: 25 | Performed by: FAMILY MEDICINE

## 2023-10-20 PROCEDURE — G0439 PPPS, SUBSEQ VISIT: HCPCS | Performed by: FAMILY MEDICINE

## 2023-10-20 ASSESSMENT — ENCOUNTER SYMPTOMS
FREQUENCY: 0
CHILLS: 0
HEARTBURN: 0
MYALGIAS: 0
PALPITATIONS: 0
CONSTIPATION: 0
ARTHRALGIAS: 1
HEMATOCHEZIA: 0
SORE THROAT: 0
DIZZINESS: 0
FEVER: 0
SHORTNESS OF BREATH: 0
DYSURIA: 0
DIARRHEA: 0
WEAKNESS: 0
NAUSEA: 0
JOINT SWELLING: 0
HEADACHES: 0
ABDOMINAL PAIN: 0
EYE PAIN: 0
NERVOUS/ANXIOUS: 0
HEMATURIA: 0
PARESTHESIAS: 0
COUGH: 0

## 2023-10-20 ASSESSMENT — ACTIVITIES OF DAILY LIVING (ADL)
DIFFICULTY_EATING/SWALLOWING: NO
WEAR_GLASSES_OR_BLIND: YES
WALKING_OR_CLIMBING_STAIRS_DIFFICULTY: NO
CURRENT_FUNCTION: NO ASSISTANCE NEEDED
CHANGE_IN_FUNCTIONAL_STATUS_SINCE_ONSET_OF_CURRENT_ILLNESS/INJURY: NO
TOILETING_ISSUES: NO
FALL_HISTORY_WITHIN_LAST_SIX_MONTHS: NO
DIFFICULTY_COMMUNICATING: NO
DRESSING/BATHING_DIFFICULTY: NO
DOING_ERRANDS_INDEPENDENTLY_DIFFICULTY: NO
CONCENTRATING,_REMEMBERING_OR_MAKING_DECISIONS_DIFFICULTY: NO
HEARING_DIFFICULTY_OR_DEAF: NO

## 2023-10-20 ASSESSMENT — PAIN SCALES - GENERAL: PAINLEVEL: NO PAIN (0)

## 2023-10-20 NOTE — PROGRESS NOTES
SUBJECTIVE:   Dio is a 83 year old who presents for Preventive Visit.        He overall reports he is doing well. He is , he has a significant other reports having a good relationship. He has adult children and grandchildren.    He is type II diabetes. He is on metformin glipizide. A1c has improved. There is more microalbumin protein in the urine on his recent check. We discussed this today. We discussed steps to take to further control diabetes, maintain hydration and overall good kidney health. Blood pressure is reasonable. He does not have neuropathy of any significant degree. He is up-to-date with eye exam no history of retinopathy. We have held off on using an ACE inhibitor for his microalbuminuria given a tendency toward hyperkalemia. He has BPH well managed with medication currently. Reports no concern. We talked a bit about use of erectile dysfunction medications today. We talked about musculoskeletal concerns. We talked about prostate cancer screening and immunizations.    Are you in the first 12 months of your Medicare coverage?  No    HPI    Today's PHQ-2 Score:       10/20/2023     4:09 PM   PHQ-2 ( 1999 [employer])   Q1: Little interest or pleasure in doing things 0   Q2: Feeling down, depressed or hopeless 0   PHQ-2 Score 0   Q1: Little interest or pleasure in doing things Not at all   Q2: Feeling down, depressed or hopeless Not at all   PHQ-2 Score 0     Have you ever done Advance Care Planning? (For example, a Health Directive, POLST, or a discussion with a medical provider or your loved ones about your wishes): Yes, advance care planning is on file.    Fallen 2 or more times in the past year?: No  Any fall with injury in the past year?: No    Cognitive Screening   1) Repeat 3 items : Performed   2) Clock draw: NORMAL  3) 3 item recall: Recalls 3 objects  Results: 3 items recalled: COGNITIVE IMPAIRMENT LESS LIKELY    Mini-CogTM Copyright S Maurizio. Licensed by the author for use in [employer]  [facility]; reprinted with permission (soob@.[email or web address]). All rights reserved.      Do you have sleep apnea, excessive snoring or daytime drowsiness? : no    Reviewed and updated as needed this visit by clinical staff      Reviewed and updated as needed this visit by Provider    Social History     Tobacco Use     Smoking status: Some Days     Types: Cigars     Smokeless tobacco: Never     Tobacco comments:     cigars once in a while   Substance Use Topics     Alcohol use: Yes             10/20/2023     4:08 PM   Alcohol Use   Prescreen: >3 drinks/day or >7 drinks/week? No          No data to display              Do you have a current opioid prescription? No  Do you use any other controlled substances or medications that are not prescribed by a provider? None      Current providers sharing in care for this patient include:   Patient Care Team:  Rajesh Lewis MD as PCP - General  Rajesh Lewis MD as Assigned PCP    The following health maintenance items are reviewed in Epic and correct as of today:  Health Maintenance   Topic Date Due     NICOTINE/TOBACCO CESSATION COUNSELING Q 1 YR  Never done     DIABETIC FOOT EXAM  Never done     ANNUAL REVIEW OF  ORDERS  Never done     HEPATITIS B IMMUNIZATION (1 of 3 - Risk 3-dose series) Never done     RSV VACCINE 60+ (1 - 1-dose 60+ series) 10/23/2023 (Originally 1/2/2002)     EYE EXAM  03/21/2024     A1C  04/13/2024     BMP  10/13/2024     LIPID  10/13/2024     MICROALBUMIN  10/13/2024     MEDICARE ANNUAL WELLNESS VISIT  10/20/2024     FALL RISK ASSESSMENT  10/20/2024     ADVANCE CARE PLANNING  10/20/2028     DTAP/TDAP/TD IMMUNIZATION (3 - Td or Tdap) 07/30/2031     PHQ-2 (once per calendar year)  Completed     INFLUENZA VACCINE  Completed     Pneumococcal Vaccine: 65+ Years  Completed     ZOSTER IMMUNIZATION  Completed     COVID-19 Vaccine  Completed     IPV IMMUNIZATION  Aged Out     HPV IMMUNIZATION  Aged Out     MENINGITIS IMMUNIZATION  Aged Out  "              Review of Systems  Complete review of systems is obtained.  Other than the specific considerations noted above complete review of systems is negative.      OBJECTIVE:   /76   Pulse 63   Temp 98.6  F (37  C)   Resp 18   Ht 1.753 m (5' 9\")   Wt 80.3 kg (177 lb)   SpO2 98%   BMI 26.14 kg/m   Estimated body mass index is 26.14 kg/m  as calculated from the following:    Height as of this encounter: 1.753 m (5' 9\").    Weight as of this encounter: 80.3 kg (177 lb).  Physical Exam          General Appearance:    Alert, cooperative, no distress   Eyes:   No scleral icterus or conjunctival irritation       Ears:    Normal TM's and external ear canals, both ears   Throat:   Lips, mucosa, and tongue normal; teeth and gums normal   Neck:   Supple, symmetrical, trachea midline, no adenopathy;        thyroid:  No enlargement/tenderness/nodules   Lungs:     Clear to auscultation bilaterally, respirations unlabored, no wheezesor crackles   Heart:    Regular rate and rhythm,  No murmur   Abdomen:    Soft, no distention, no tenderness on palpation, no masses, no organomegaly     Extremities:  No edema, no jointswelling or redness, no evidence of any injuries, palpable dorsalis pedis pulses, palpable posterior tibialis pulses.  No ulcerations.  No significant callus formation.  No other foot deformities.   Skin:  Noconcerning skin findings, no suspicious moles, no rashes   Neurologic:  On gross examination there is no motor or sensory deficit.  Specific examination of the feet using the monofilament line revealsthat there is no absence of sensation.  Patient walks with a normal gait               ASSESSMENT / PLAN:   Dio was seen today for recheck medication, wellness visit, medication question, erectile dysfunction and hand pain.    Diagnoses and all orders for this visit:    Medicare annual wellness visit, subsequent    BPH with obstruction/lower urinary tract symptoms  -     tamsulosin (FLOMAX) 0.4 MG " "capsule; Take 2 capsules (0.8 mg) by mouth every evening    Type 2 diabetes mellitus with microalbuminuria, without long-term current use of insulin (H)  -     Basic metabolic panel; Future  -     Hemoglobin A1c; Future  -     Albumin Random Urine Quantitative with Creat Ratio; Future    Hyperlipidemia, unspecified hyperlipidemia type    Primary hypertension    Screening for prostate cancer    Erectile dysfunction, unspecified erectile dysfunction type           Patient has been advised of split billing requirements and indicates understanding: Yes      COUNSELING:  Reviewed preventive health counseling, as reflected in patient instructions       Regular exercise       Healthy diet/nutrition       Vision screening       Dental care      BMI:   Estimated body mass index is 26.14 kg/m  as calculated from the following:    Height as of this encounter: 1.753 m (5' 9\").    Weight as of this encounter: 80.3 kg (177 lb).         He reports that he has been smoking cigars. He has never used smokeless tobacco.      Appropriate preventive services were discussed with this patient, including applicable screening as appropriate for fall prevention, nutrition, physical activity, Tobacco-use cessation, weight loss and cognition.  Checklist reviewing preventive services available has been given to the patient.    Reviewed patients plan of care and provided an AVS. The Basic Care Plan (routine screening as documented in Health Maintenance) for Dio meets the Care Plan requirement. This Care Plan has been established and reviewed with the Patient.        Rajesh Lewis MD, MD  [facility]    Identified Health Risks:    "

## 2023-10-22 RX ORDER — TAMSULOSIN HYDROCHLORIDE 0.4 MG/1
0.8 CAPSULE ORAL EVERY EVENING
Qty: 180 CAPSULE | Refills: 3 | Status: SHIPPED | OUTPATIENT
Start: 2023-10-22 | End: 2024-07-31

## 2024-01-10 ENCOUNTER — TRANSFERRED RECORDS (OUTPATIENT)
Dept: MULTI SPECIALTY CLINIC | Facility: CLINIC | Age: 84
End: 2024-01-10

## 2024-01-10 LAB — RETINOPATHY: NORMAL

## 2024-03-25 DIAGNOSIS — E11.9 TYPE 2 DIABETES MELLITUS (H): ICD-10-CM

## 2024-03-25 DIAGNOSIS — E11.9 DIABETES MELLITUS, TYPE 2 (H): ICD-10-CM

## 2024-03-25 DIAGNOSIS — I10 HTN (HYPERTENSION): ICD-10-CM

## 2024-03-25 DIAGNOSIS — E78.5 HYPERLIPIDEMIA: ICD-10-CM

## 2024-03-26 RX ORDER — ATENOLOL 25 MG/1
TABLET ORAL
Qty: 90 TABLET | Refills: 0 | Status: SHIPPED | OUTPATIENT
Start: 2024-03-26 | End: 2024-05-25

## 2024-03-26 RX ORDER — SIMVASTATIN 40 MG
TABLET ORAL
Qty: 90 TABLET | Refills: 0 | Status: SHIPPED | OUTPATIENT
Start: 2024-03-26 | End: 2024-05-25

## 2024-03-26 RX ORDER — GLIPIZIDE 10 MG/1
TABLET ORAL
Qty: 180 TABLET | Refills: 0 | Status: SHIPPED | OUTPATIENT
Start: 2024-03-26 | End: 2024-05-27

## 2024-05-09 DIAGNOSIS — E11.9 DIABETES MELLITUS, TYPE 2 (H): ICD-10-CM

## 2024-05-09 RX ORDER — BLOOD SUGAR DIAGNOSTIC
STRIP MISCELLANEOUS
Qty: 300 STRIP | Refills: 2 | Status: SHIPPED | OUTPATIENT
Start: 2024-05-09

## 2024-05-12 ENCOUNTER — HEALTH MAINTENANCE LETTER (OUTPATIENT)
Age: 84
End: 2024-05-12

## 2024-05-24 DIAGNOSIS — I10 HTN (HYPERTENSION): ICD-10-CM

## 2024-05-24 DIAGNOSIS — E11.9 DIABETES MELLITUS, TYPE 2 (H): ICD-10-CM

## 2024-05-24 DIAGNOSIS — E11.9 TYPE 2 DIABETES MELLITUS (H): ICD-10-CM

## 2024-05-24 DIAGNOSIS — E78.5 HYPERLIPIDEMIA: ICD-10-CM

## 2024-05-25 RX ORDER — SIMVASTATIN 40 MG
TABLET ORAL
Qty: 90 TABLET | Refills: 0 | Status: SHIPPED | OUTPATIENT
Start: 2024-05-25 | End: 2024-08-13

## 2024-05-25 RX ORDER — ATENOLOL 25 MG/1
TABLET ORAL
Qty: 90 TABLET | Refills: 0 | Status: SHIPPED | OUTPATIENT
Start: 2024-05-25 | End: 2024-08-13

## 2024-05-27 RX ORDER — GLIPIZIDE 10 MG/1
TABLET ORAL
Qty: 180 TABLET | Refills: 3 | Status: SHIPPED | OUTPATIENT
Start: 2024-05-27 | End: 2024-05-28

## 2024-05-28 ENCOUNTER — NURSE TRIAGE (OUTPATIENT)
Dept: NURSING | Facility: CLINIC | Age: 84
End: 2024-05-28

## 2024-05-28 ENCOUNTER — OFFICE VISIT (OUTPATIENT)
Dept: FAMILY MEDICINE | Facility: CLINIC | Age: 84
End: 2024-05-28
Payer: COMMERCIAL

## 2024-05-28 VITALS
WEIGHT: 174.2 LBS | HEART RATE: 79 BPM | BODY MASS INDEX: 25.8 KG/M2 | RESPIRATION RATE: 18 BRPM | OXYGEN SATURATION: 97 % | SYSTOLIC BLOOD PRESSURE: 136 MMHG | DIASTOLIC BLOOD PRESSURE: 76 MMHG | TEMPERATURE: 98.6 F | HEIGHT: 69 IN

## 2024-05-28 DIAGNOSIS — B02.9 HERPES ZOSTER WITHOUT COMPLICATION: Primary | ICD-10-CM

## 2024-05-28 DIAGNOSIS — R80.9 TYPE 2 DIABETES MELLITUS WITH MICROALBUMINURIA, WITHOUT LONG-TERM CURRENT USE OF INSULIN (H): ICD-10-CM

## 2024-05-28 DIAGNOSIS — E11.29 TYPE 2 DIABETES MELLITUS WITH MICROALBUMINURIA, WITHOUT LONG-TERM CURRENT USE OF INSULIN (H): ICD-10-CM

## 2024-05-28 PROCEDURE — 99213 OFFICE O/P EST LOW 20 MIN: CPT | Performed by: FAMILY MEDICINE

## 2024-05-28 RX ORDER — GLIPIZIDE 10 MG/1
10 TABLET ORAL 3 TIMES DAILY
Status: SHIPPED
Start: 2024-05-28 | End: 2024-08-13

## 2024-05-28 ASSESSMENT — PAIN SCALES - GENERAL: PAINLEVEL: NO PAIN (0)

## 2024-05-28 NOTE — PROGRESS NOTES
"Dio Curran  /76   Pulse 79   Temp 98.6  F (37  C)   Resp 18   Ht 1.753 m (5' 9\")   Wt 79 kg (174 lb 3.2 oz)   SpO2 97%   BMI 25.72 kg/m       Assessment/Plan:                Dio was seen today for rash and recheck medication.    Diagnoses and all orders for this visit:    Herpes zoster without complication    Type 2 diabetes mellitus with microalbuminuria, without long-term current use of insulin (H)         DISCUSSION  Rash consistent with shingles.  No indication for intervention at this time based on duration and absence of any concerning symptoms.  Continue to monitor.    Increase glipizide to 3 tablets daily return to taking 2 tablets daily of metformin immediately.  Schedule follow-up to reassess labs including urine and blood testing.  Subjective:     HPI:    Dio Curran is a 84 year old male he reports  2 weeks ago he developed a rash consisting of small red bumps in the left side upper chest region that goes around the flank from the front to the back.  It does appear to be in a dermatomal pattern.  Patient reports no pain.  He does state that they seem to be blisterlike at some point but those blisterlike qualities have subsided.  He has a history of vaccination for shingles.  He has never had shingles before.    He is a type II diabetic.  He has noted blood sugar readings higher in the morning.  He took upon himself to increase his medications to 3 tablets daily instead of 2.  Discussed that 3 tablets of 1000 mg metformin exceeds the recommended dose and I recommend he return to 2.  It is reasonable to proceed with 3 tablets of glipizide daily discussed the potential risk of hypoglycemia.  He is not reporting any currently and is aware that he must eat after taking the medication he will monitor closely but we will continue with higher dose.    ROS:  Complete review of systems is obtained.  Other than the specific considerations noted above complete review of systems is " negative.          Objective:   Medications:  Current Outpatient Medications   Medication Sig Dispense Refill    ACCU-CHEK PATRICIA PLUS test strip USE TO CHECK BLOOD SUGAR 3 TIMES DAILY FOLLOW MANUFACTURERS  DIRECTIONS FOR  strip 2    aspirin 81 MG EC tablet [ASPIRIN 81 MG EC TABLET] Take 81 mg by mouth daily.       atenolol (TENORMIN) 25 MG tablet TAKE 1 TABLET BY MOUTH DAILY 90 tablet 0    blood glucose test strips [BLOOD GLUCOSE TEST STRIPS] Accu-Chek Patricia  -Use to check blood sugars three times a day. Follow manufactures directions for use.Type II or unspecified type diabetes mellitus without mention of complication, not stated as uncontrolled  E11.9 300 strip 3    blood-glucose meter Misc [BLOOD-GLUCOSE METER MISC] Use daily with test strips to check blood sugars. 1 each 0    calcium, as carbonate, (OS-RUBEN) 500 mg calcium (1,250 mg) tablet Take 1 tablet by mouth 2 times daily      cholecalciferol, vitamin D3, (VITAMIN D3) 1,000 unit capsule [CHOLECALCIFEROL, VITAMIN D3, (VITAMIN D3) 1,000 UNIT CAPSULE] Take 1,000 Units by mouth daily.      co-enzyme Q-10 50 mg capsule [CO-ENZYME Q-10 50 MG CAPSULE] Take 50 mg by mouth daily.      fish oil-omega-3 fatty acids (FISH OIL) 300-1,000 mg capsule [FISH OIL-OMEGA-3 FATTY ACIDS (FISH OIL) 300-1,000 MG CAPSULE] Take 2 g by mouth daily.      generic lancets (ACCU-CHEK SOFTCLIX LANCETS) [GENERIC LANCETS (ACCU-CHEK SOFTCLIX LANCETS)] USE AS DIRECTED  TO TEST BLOOD GLUCOSE AS NEEDED 300 each 3    glipiZIDE (GLUCOTROL) 10 MG tablet TAKE 1 TABLET BY MOUTH TWICE  DAILY (Patient taking differently: Take 10 mg by mouth 3 times daily) 180 tablet 3    glucosamine-chondroitin 500-400 mg cap [GLUCOSAMINE-CHONDROITIN 500-400 MG CAP] Take 2 capsules by mouth daily.      metFORMIN (GLUCOPHAGE) 1000 MG tablet TAKE 1 TABLET BY MOUTH TWICE  DAILY WITH MEALS (Patient taking differently: Take 1,000 mg by mouth 3 times daily) 180 tablet 3    multivitamin therapeutic tablet [MULTIVITAMIN  THERAPEUTIC TABLET] Take 1 tablet by mouth daily.      ONETOUCH ULTRA BLUE TEST STRIP strips [ONETOUCH ULTRA BLUE TEST STRIP STRIPS] USE 1 EACH AS DIRECTED 3 (THREE) TIMES A DAY. 300 strip 3    saw palmetto fruit 450 mg cap [SAW PALMETTO FRUIT 450 MG CAP] Take 2 capsules by mouth daily.      sildenafil (VIAGRA) 100 MG tablet TAKE 1 TABLET BY MOUTH AS NEEDED 30 tablet 6    simvastatin (ZOCOR) 40 MG tablet TAKE 1 TABLET BY MOUTH AT  BEDTIME 90 tablet 0    tamsulosin (FLOMAX) 0.4 MG capsule Take 2 capsules (0.8 mg) by mouth every evening 180 capsule 3     No current facility-administered medications for this visit.        Allergies:     Allergies   Allergen Reactions    Levofloxacin Swelling     Throat swelling    Dextrose     Dextrose [Glucose] Unknown    Piperazine Anhydrous [Piperazine] Unknown        Social History     Socioeconomic History    Marital status:      Spouse name: Not on file    Number of children: Not on file    Years of education: Not on file    Highest education level: Not on file   Occupational History    Not on file   Tobacco Use    Smoking status: Some Days     Types: Cigars    Smokeless tobacco: Never    Tobacco comments:     cigars once in a while   Vaping Use    Vaping status: Never Used   Substance and Sexual Activity    Alcohol use: Yes    Drug use: Not Currently    Sexual activity: Not on file   Other Topics Concern    Not on file   Social History Narrative    Not on file     Social Determinants of Health     Financial Resource Strain: Low Risk  (10/20/2023)    Financial Resource Strain     Within the past 12 months, have you or your family members you live with been unable to get utilities (heat, electricity) when it was really needed?: No   Food Insecurity: Low Risk  (10/20/2023)    Food Insecurity     Within the past 12 months, did you worry that your food would run out before you got money to buy more?: No     Within the past 12 months, did the food you bought just not last and  you didn t have money to get more?: No   Transportation Needs: Low Risk  (10/20/2023)    Transportation Needs     Within the past 12 months, has lack of transportation kept you from medical appointments, getting your medicines, non-medical meetings or appointments, work, or from getting things that you need?: No   Physical Activity: Not on file   Stress: Not on file   Social Connections: Not on file   Interpersonal Safety: Low Risk  (10/20/2023)    Interpersonal Safety     Do you feel physically and emotionally safe where you currently live?: Yes     Within the past 12 months, have you been hit, slapped, kicked or otherwise physically hurt by someone?: No     Within the past 12 months, have you been humiliated or emotionally abused in other ways by your partner or ex-partner?: No   Recent Concern: Interpersonal Safety - High Risk (10/20/2023)    Interpersonal Safety     Do you feel physically and emotionally safe where you currently live?: Yes     Within the past 12 months, have you been hit, slapped, kicked or otherwise physically hurt by someone?: Yes     Within the past 12 months, have you been humiliated or emotionally abused in other ways by your partner or ex-partner?: No   Housing Stability: Low Risk  (10/20/2023)    Housing Stability     Do you have housing? : Yes     Are you worried about losing your housing?: No       No family history on file.     Most Recent Immunizations   Administered Date(s) Administered    COVID-19 12+ (2023-24) (Pfizer) 10/17/2023    COVID-19 Bivalent 12+ (Pfizer) 09/27/2022    COVID-19 MONOVALENT 12+ (Pfizer) 10/18/2021    COVID-19 Monovalent 12+ (Pfizer 2022) 04/08/2022    DT (PEDS <7y) 04/07/2000    Flu, Unspecified 11/18/2008    Influenza (H1N1) 01/25/2010    Influenza (High Dose) 3 valent vaccine 09/27/2019    Influenza (IIV3) PF 10/06/2014    Influenza Vaccine 65+ (FLUAD) 10/17/2023    Influenza Vaccine 65+ (Fluzone HD) 09/27/2022    Influenza Vaccine, 6+MO IM (QUADRIVALENT  "W/PRESERVATIVES) 10/06/2014    Influenza, seasonal, injectable, PF 09/27/2010    Pneumo Conj 13-V (2010&after) 04/13/2015    Pneumococcal 23 valent 05/14/2008    RSV Vaccine (Arexvy) 10/23/2023    TDAP (Adacel,Boostrix) 07/30/2021    Td (Adult), Adsorbed 04/24/2009    Td,adult,historic,unspecified 04/24/2009    Zoster recombinant adjuvanted (SHINGRIX) 04/06/2023    Zoster vaccine, live 10/24/2007        Wt Readings from Last 3 Encounters:   05/28/24 79 kg (174 lb 3.2 oz)   10/20/23 80.3 kg (177 lb)   08/26/22 81.2 kg (179 lb)        BP Readings from Last 6 Encounters:   05/28/24 136/76   10/20/23 136/76   08/26/22 128/76   07/30/21 128/76   04/12/21 128/70   02/01/21 128/74        Hemoglobin A1C   Date Value Ref Range Status   10/13/2023 7.7 (H) 0.0 - 5.6 % Final     Comment:     Normal <5.7%   Prediabetes 5.7-6.4%    Diabetes 6.5% or higher     Note: Adopted from ADA consensus guidelines.   08/31/2022 8.7 (H) 0.0 - 5.6 % Final     Comment:     Normal <5.7%   Prediabetes 5.7-6.4%    Diabetes 6.5% or higher     Note: Adopted from ADA consensus guidelines.   08/18/2022 8.7 (H) 0.0 - 5.6 % Final     Comment:     Normal <5.7%   Prediabetes 5.7-6.4%    Diabetes 6.5% or higher     Note: Adopted from ADA consensus guidelines.              PHYSICAL EXAM:    /76   Pulse 79   Temp 98.6  F (37  C)   Resp 18   Ht 1.753 m (5' 9\")   Wt 79 kg (174 lb 3.2 oz)   SpO2 97%   BMI 25.72 kg/m       General: Patient alert no signs of distress    Examination reveals that there is a skin rash consisting of small red papules that spans from the upper chest region in the front around the flank through the lower portion of the axilla around to the back in a typical dermatomal pattern.  No sign of secondary infection.                          "

## 2024-05-28 NOTE — TELEPHONE ENCOUNTER
Pt calling with request to speak to Bobby with question about last appt.    Pt reports he was advised to call back with questions after last office visit so is calling back now.    Rash on stomach left side that wraps around to left back  Itches a little but no pain  Looks red with some small blister like spots - not a lot  Pt 'thinks it's shingles but wants to take care of this over the phone'  States, 'has had shingles vaccine but pretty sure it is shingles'  States, 'no big deal.  Just would like to speak with Bobby!'    Msg routed to PCP/Care Team    Danni Espinal RN, Nurse Advisor 10:31 AM 5/28/2024

## 2024-05-28 NOTE — TELEPHONE ENCOUNTER
I was able to have Don come in today for a quick check-up on his possible shingles concern. 350pm appt today

## 2024-07-12 ENCOUNTER — NURSE TRIAGE (OUTPATIENT)
Dept: FAMILY MEDICINE | Facility: CLINIC | Age: 84
End: 2024-07-12
Payer: COMMERCIAL

## 2024-07-12 NOTE — TELEPHONE ENCOUNTER
Patient called with non productive cough that has had for a few days.  No other symptoms and sputum is white.  Patient was requesting OTC cough medications.  Home care advice given, per protocol.  Patient verbalized understanding.     MALGORZATA Li RN  [facility]      Reason for Disposition   Cough with cold symptoms (e.g., runny nose, postnasal drip, throat clearing)    Additional Information   Negative: Bluish (or gray) lips or face   Negative: SEVERE difficulty breathing (e.g., struggling for each breath, speaks in single words)   Negative: Rapid onset of cough and has hives   Negative: Coughing started suddenly after medicine, an allergic food or bee sting   Negative: Difficulty breathing after exposure to flames, smoke, or fumes   Negative: Sounds like a life-threatening emergency to the triager   Negative: MODERATE difficulty breathing (e.g., speaks in phrases, SOB even at rest, pulse 100-120) and still present when not coughing   Negative: Chest pain present when not coughing   Negative: Passed out (i.e., fainted, collapsed and was not responding)   Negative: Patient sounds very sick or weak to the triager   Negative: MILD difficulty breathing (e.g., minimal/no SOB at rest, SOB with walking, pulse <100) and still present when not coughing   Negative: Coughed up > 1 tablespoon (15 ml) blood (Exception: Blood-tinged sputum.)   Negative: Fever > 103 F (39.4 C)   Negative: Fever > 101 F (38.3 C) and over 60 years of age   Negative: Fever > 100.0 F (37.8 C) and has diabetes mellitus or a weak immune system (e.g., HIV positive, cancer chemotherapy, organ transplant, splenectomy, chronic steroids)   Negative: Fever > 100.0 F (37.8 C) and bedridden (e.g., CVA, chronic illness, recovering from surgery)   Negative: Increasing ankle swelling   Negative: Wheezing is present   Negative: SEVERE coughing spells (e.g., whooping sound after coughing, vomiting after coughing)   Negative: Coughing  "up benjamin-colored (reddish-brown) or blood-tinged sputum   Negative: Fever present > 3 days (72 hours)   Negative: Fever returns after gone for over 24 hours and symptoms worse or not improved   Negative: Using nasal washes and pain medicine > 24 hours and sinus pain persists   Negative: Known COPD or other severe lung disease (i.e., bronchiectasis, cystic fibrosis, lung surgery) and worsening symptoms (i.e., increased sputum purulence or amount, increased breathing difficulty)   Negative: Continuous (nonstop) coughing interferes with work or school and no improvement using cough treatment per Care Advice   Negative: Patient wants to be seen   Negative: Cough has been present for > 3 weeks   Negative: Allergy symptoms are also present (e.g., itchy eyes, clear nasal discharge, postnasal drip)   Negative: Nasal discharge present > 10 days   Negative: Exposure to TB (Tuberculosis)   Negative: Taking an ACE Inhibitor medicine (e.g., benazepril/LOTENSIN, captopril/CAPOTEN, enalapril/VASOTEC, lisinopril/ZESTRIL)   Negative: Cough with no complications    Answer Assessment - Initial Assessment Questions  1. ONSET: \"When did the cough begin?\"       Since Monay  2. SEVERITY: \"How bad is the cough today?\"       Not too bad  3. SPUTUM: \"Describe the color of your sputum\" (none, dry cough; clear, white, yellow, green)      white  4. HEMOPTYSIS: \"Are you coughing up any blood?\" If so ask: \"How much?\" (flecks, streaks, tablespoons, etc.)      no  5. DIFFICULTY BREATHING: \"Are you having difficulty breathing?\" If Yes, ask: \"How bad is it?\" (e.g., mild, moderate, severe)     - MILD: No SOB at rest, mild SOB with walking, speaks normally in sentences, can lie down, no retractions, pulse < 100.     - MODERATE: SOB at rest, SOB with minimal exertion and prefers to sit, cannot lie down flat, speaks in phrases, mild retractions, audible wheezing, pulse 100-120.     - SEVERE: Very SOB at rest, speaks in single words, struggling to " "breathe, sitting hunched forward, retractions, pulse > 120       no  6. FEVER: \"Do you have a fever?\" If Yes, ask: \"What is your temperature, how was it measured, and when did it start?\"      no  7. CARDIAC HISTORY: \"Do you have any history of heart disease?\" (e.g., heart attack, congestive heart failure)       no  8. LUNG HISTORY: \"Do you have any history of lung disease?\"  (e.g., pulmonary embolus, asthma, emphysema)      no  9. PE RISK FACTORS: \"Do you have a history of blood clots?\" (or: recent major surgery, recent prolonged travel, bedridden)      no  10. OTHER SYMPTOMS: \"Do you have any other symptoms?\" (e.g., runny nose, wheezing, chest pain)        Little runny nose  11. PREGNANCY: \"Is there any chance you are pregnant?\" \"When was your last menstrual period?\"        no  12. TRAVEL: \"Have you traveled out of the country in the last month?\" (e.g., travel history, exposures)        no    Protocols used: Cough-A-[place]    "

## 2024-07-12 NOTE — TELEPHONE ENCOUNTER
Symptoms    Describe your symptoms: Persistent cough - cold symptom - feels like phlegm in lungs     Any pain: No    How long have you been having symptoms: 3-4  days    Have you been seen for this:  No    Appointment offered?: No - wants to request for cold symptom meds    Triage offered?: Yes: pt is open to triage line, going to transfer, pt is requesting a call back from Finn England MA    Home remedies tried: N/A - okay with OTC meds    Preferred Pharmacy:       [id number] [address] [address]  Phone: [phone] Fax: [phone]    Could we send this information to you in [employer] [facility] or would you prefer to receive a phone call?:   Patient would prefer a phone call   Okay to leave a detailed message?: Yes at Cell number on file:    No relevant phone numbers on file.

## 2024-07-31 DIAGNOSIS — N40.1 BPH WITH OBSTRUCTION/LOWER URINARY TRACT SYMPTOMS: ICD-10-CM

## 2024-07-31 DIAGNOSIS — N13.8 BPH WITH OBSTRUCTION/LOWER URINARY TRACT SYMPTOMS: ICD-10-CM

## 2024-07-31 RX ORDER — TAMSULOSIN HYDROCHLORIDE 0.4 MG/1
CAPSULE ORAL
Qty: 200 CAPSULE | Refills: 2 | Status: SHIPPED | OUTPATIENT
Start: 2024-07-31

## 2024-08-13 DIAGNOSIS — E11.29 TYPE 2 DIABETES MELLITUS WITH MICROALBUMINURIA, WITHOUT LONG-TERM CURRENT USE OF INSULIN (H): ICD-10-CM

## 2024-08-13 DIAGNOSIS — I10 HTN (HYPERTENSION): ICD-10-CM

## 2024-08-13 DIAGNOSIS — R80.9 TYPE 2 DIABETES MELLITUS WITH MICROALBUMINURIA, WITHOUT LONG-TERM CURRENT USE OF INSULIN (H): ICD-10-CM

## 2024-08-13 DIAGNOSIS — E78.5 HYPERLIPIDEMIA: ICD-10-CM

## 2024-08-13 RX ORDER — GLIPIZIDE 10 MG/1
10 TABLET ORAL 3 TIMES DAILY
Qty: 300 TABLET | Refills: 3 | Status: SHIPPED | OUTPATIENT
Start: 2024-08-13

## 2024-08-13 RX ORDER — SIMVASTATIN 40 MG
40 TABLET ORAL AT BEDTIME
Qty: 100 TABLET | Refills: 3 | Status: SHIPPED | OUTPATIENT
Start: 2024-08-13

## 2024-08-13 RX ORDER — ATENOLOL 25 MG/1
25 TABLET ORAL DAILY
Qty: 100 TABLET | Refills: 3 | Status: SHIPPED | OUTPATIENT
Start: 2024-08-13

## 2024-08-13 RX ORDER — GLIPIZIDE 10 MG/1
TABLET ORAL
Qty: 1 TABLET | Refills: 0 | OUTPATIENT
Start: 2024-08-13

## 2024-08-13 NOTE — TELEPHONE ENCOUNTER
Patient has [facility] coverage and with this insurance plan, the patient is eligible to receive certain prescriptions as a 100-day supply at the 90-day supply cost.      Prescriptions to be updated to 100-day supply: metformin, glipizide, simvastatin and atenolol     New prescription needed given insufficient refills remaining so pended for PCP review and signature.       Thank you!    Janice Fermin, PharmD, [facility]  Population Health Pharmacist  [phone]

## 2024-09-03 NOTE — TELEPHONE ENCOUNTER
"Last Written Prescription Date:  10/21/21  Last Fill Quantity: 300,  # refills: 3   Last office visit provider:  8/26/22     Requested Prescriptions   Pending Prescriptions Disp Refills     ACCU-CHEK PATRICIA PLUS test strip [Pharmacy Med Name: Accu-Chek Patricia Plus In Vitro Strip] 300 strip 2     Sig: USE TO CHECK BLOOD SUGARS 3 TIMES DAILY .  FOLLOW  MANUFACTURES DIRECTIONS FOR USE.       Diabetic Supplies Protocol Passed - 10/24/2022  9:06 PM        Passed - Medication is active on med list        Passed - Patient is 18 years of age or older        Passed - Recent (6 mo) or future (30 days) visit within the authorizing provider's specialty     Patient had office visit in the last 6 months or has a visit in the next 30 days with authorizing provider.  See \"Patient Info\" tab in inbasket, or \"Choose Columns\" in Meds & Orders section of the refill encounter.                 Yasmeen Govea RN 10/25/22 5:24 PM  " Subjective   Royer Wen is a 26 y.o. male who returns to the office after undergoing a laparoscopic appendectomy on 8/14/2024.     History of Present Illness     The patient is recovering well with no significant postop symptoms.  He is having no abdominal pain.  He has a good appetite and normal bowel function.  His energy level is good.      Review of Systems   Constitutional:  Negative for activity change, appetite change, fatigue and fever.   Respiratory:  Negative for chest tightness and shortness of breath.    Cardiovascular:  Negative for chest pain and palpitations.   Gastrointestinal:  Negative for abdominal pain, constipation, diarrhea and nausea.   Skin:  Negative for rash and wound.   Psychiatric/Behavioral:  Negative for agitation and confusion.        Objective   Physical Exam  Constitutional:       General: He is not in acute distress.     Appearance: Normal appearance. He is not ill-appearing or toxic-appearing.   Pulmonary:      Effort: Pulmonary effort is normal. No respiratory distress.   Abdominal:      Palpations: Abdomen is soft.      Tenderness: There is no abdominal tenderness.   Skin:     Comments: Incision: intact with no evidence of infection.   Neurological:      Mental Status: He is alert.   Psychiatric:         Behavior: Behavior normal.                 Assessment & Plan     1.  Postoperative visit: The patient is recovering well from his laparoscopic appendectomy.  At this point he has no limitations.  He will follow-up on an as-needed basis.

## 2024-09-13 ENCOUNTER — TELEPHONE (OUTPATIENT)
Dept: FAMILY MEDICINE | Facility: CLINIC | Age: 84
End: 2024-09-13
Payer: COMMERCIAL

## 2024-09-13 DIAGNOSIS — Z12.5 SCREENING FOR PROSTATE CANCER: ICD-10-CM

## 2024-09-13 DIAGNOSIS — E78.5 HYPERLIPIDEMIA, UNSPECIFIED HYPERLIPIDEMIA TYPE: ICD-10-CM

## 2024-09-13 DIAGNOSIS — I10 PRIMARY HYPERTENSION: ICD-10-CM

## 2024-09-13 DIAGNOSIS — R80.9 TYPE 2 DIABETES MELLITUS WITH MICROALBUMINURIA, WITHOUT LONG-TERM CURRENT USE OF INSULIN (H): Primary | ICD-10-CM

## 2024-09-13 DIAGNOSIS — E11.29 TYPE 2 DIABETES MELLITUS WITH MICROALBUMINURIA, WITHOUT LONG-TERM CURRENT USE OF INSULIN (H): Primary | ICD-10-CM

## 2024-09-13 NOTE — TELEPHONE ENCOUNTER
Please put in yearly lab order for pt , his Wellness exam is on 10/25/2024, Lab appt is 10/21/2024

## 2024-10-21 ENCOUNTER — LAB (OUTPATIENT)
Dept: LAB | Facility: CLINIC | Age: 84
End: 2024-10-21
Payer: COMMERCIAL

## 2024-10-21 DIAGNOSIS — E11.29 TYPE 2 DIABETES MELLITUS WITH MICROALBUMINURIA, WITHOUT LONG-TERM CURRENT USE OF INSULIN (H): ICD-10-CM

## 2024-10-21 DIAGNOSIS — Z12.5 SCREENING FOR PROSTATE CANCER: ICD-10-CM

## 2024-10-21 DIAGNOSIS — R80.9 TYPE 2 DIABETES MELLITUS WITH MICROALBUMINURIA, WITHOUT LONG-TERM CURRENT USE OF INSULIN (H): ICD-10-CM

## 2024-10-21 DIAGNOSIS — I10 PRIMARY HYPERTENSION: ICD-10-CM

## 2024-10-21 DIAGNOSIS — E78.5 HYPERLIPIDEMIA, UNSPECIFIED HYPERLIPIDEMIA TYPE: ICD-10-CM

## 2024-10-21 LAB
ALBUMIN SERPL BCG-MCNC: 4.3 G/DL (ref 3.5–5.2)
ALP SERPL-CCNC: 66 U/L (ref 40–150)
ALT SERPL W P-5'-P-CCNC: 16 U/L (ref 0–70)
ANION GAP SERPL CALCULATED.3IONS-SCNC: 10 MMOL/L (ref 7–15)
AST SERPL W P-5'-P-CCNC: 19 U/L (ref 0–45)
BILIRUB SERPL-MCNC: 0.8 MG/DL
BUN SERPL-MCNC: 13.8 MG/DL (ref 8–23)
CALCIUM SERPL-MCNC: 9.6 MG/DL (ref 8.8–10.4)
CHLORIDE SERPL-SCNC: 104 MMOL/L (ref 98–107)
CHOLEST SERPL-MCNC: 118 MG/DL
CREAT SERPL-MCNC: 1.22 MG/DL (ref 0.67–1.17)
EGFRCR SERPLBLD CKD-EPI 2021: 59 ML/MIN/1.73M2
EST. AVERAGE GLUCOSE BLD GHB EST-MCNC: 203 MG/DL
FASTING STATUS PATIENT QL REPORTED: ABNORMAL
FASTING STATUS PATIENT QL REPORTED: NORMAL
GLUCOSE SERPL-MCNC: 236 MG/DL (ref 70–99)
HBA1C MFR BLD: 8.7 % (ref 0–5.6)
HCO3 SERPL-SCNC: 23 MMOL/L (ref 22–29)
HDLC SERPL-MCNC: 45 MG/DL
LDLC SERPL CALC-MCNC: 47 MG/DL
NONHDLC SERPL-MCNC: 73 MG/DL
POTASSIUM SERPL-SCNC: 5.3 MMOL/L (ref 3.4–5.3)
PROT SERPL-MCNC: 6.9 G/DL (ref 6.4–8.3)
PSA SERPL DL<=0.01 NG/ML-MCNC: 1.21 NG/ML
SODIUM SERPL-SCNC: 137 MMOL/L (ref 135–145)
TRIGL SERPL-MCNC: 128 MG/DL

## 2024-10-21 PROCEDURE — 83036 HEMOGLOBIN GLYCOSYLATED A1C: CPT

## 2024-10-21 PROCEDURE — 36415 COLL VENOUS BLD VENIPUNCTURE: CPT

## 2024-10-21 PROCEDURE — 80053 COMPREHEN METABOLIC PANEL: CPT

## 2024-10-21 PROCEDURE — 80061 LIPID PANEL: CPT

## 2024-10-21 PROCEDURE — G0103 PSA SCREENING: HCPCS

## 2024-10-24 ENCOUNTER — APPOINTMENT (OUTPATIENT)
Dept: LAB | Facility: CLINIC | Age: 84
End: 2024-10-24
Payer: COMMERCIAL

## 2024-10-24 LAB
CREAT UR-MCNC: 49.1 MG/DL
MICROALBUMIN UR-MCNC: 110 MG/L
MICROALBUMIN/CREAT UR: 224.03 MG/G CR (ref 0–17)

## 2024-10-24 PROCEDURE — 82043 UR ALBUMIN QUANTITATIVE: CPT

## 2024-10-24 PROCEDURE — 82570 ASSAY OF URINE CREATININE: CPT

## 2024-10-25 ENCOUNTER — OFFICE VISIT (OUTPATIENT)
Dept: FAMILY MEDICINE | Facility: CLINIC | Age: 84
End: 2024-10-25
Payer: COMMERCIAL

## 2024-10-25 VITALS
WEIGHT: 173 LBS | RESPIRATION RATE: 18 BRPM | SYSTOLIC BLOOD PRESSURE: 132 MMHG | OXYGEN SATURATION: 97 % | BODY MASS INDEX: 25.62 KG/M2 | DIASTOLIC BLOOD PRESSURE: 78 MMHG | HEART RATE: 66 BPM | HEIGHT: 69 IN | TEMPERATURE: 98.2 F

## 2024-10-25 DIAGNOSIS — N40.1 BPH WITH OBSTRUCTION/LOWER URINARY TRACT SYMPTOMS: ICD-10-CM

## 2024-10-25 DIAGNOSIS — Z00.00 MEDICARE ANNUAL WELLNESS VISIT, SUBSEQUENT: Primary | ICD-10-CM

## 2024-10-25 DIAGNOSIS — E78.5 HYPERLIPIDEMIA, UNSPECIFIED HYPERLIPIDEMIA TYPE: ICD-10-CM

## 2024-10-25 DIAGNOSIS — E11.29 TYPE 2 DIABETES MELLITUS WITH MICROALBUMINURIA, WITHOUT LONG-TERM CURRENT USE OF INSULIN (H): ICD-10-CM

## 2024-10-25 DIAGNOSIS — R80.9 TYPE 2 DIABETES MELLITUS WITH MICROALBUMINURIA, WITHOUT LONG-TERM CURRENT USE OF INSULIN (H): ICD-10-CM

## 2024-10-25 DIAGNOSIS — N13.8 BPH WITH OBSTRUCTION/LOWER URINARY TRACT SYMPTOMS: ICD-10-CM

## 2024-10-25 DIAGNOSIS — I10 PRIMARY HYPERTENSION: ICD-10-CM

## 2024-10-25 DIAGNOSIS — Z12.5 SCREENING FOR PROSTATE CANCER: ICD-10-CM

## 2024-10-25 PROCEDURE — G0439 PPPS, SUBSEQ VISIT: HCPCS | Performed by: FAMILY MEDICINE

## 2024-10-25 SDOH — HEALTH STABILITY: PHYSICAL HEALTH: ON AVERAGE, HOW MANY DAYS PER WEEK DO YOU ENGAGE IN MODERATE TO STRENUOUS EXERCISE (LIKE A BRISK WALK)?: 2 DAYS

## 2024-10-25 ASSESSMENT — SOCIAL DETERMINANTS OF HEALTH (SDOH): HOW OFTEN DO YOU GET TOGETHER WITH FRIENDS OR RELATIVES?: THREE TIMES A WEEK

## 2024-10-25 NOTE — LETTER
October 20, 2023        Dio Curran  [id number] [address]           Dear ,     We are writing to inform you of your test results.           Recent Results (from the past 240 hour(s))   Comprehensive metabolic panel     Collection Time: 10/13/23  8:14 AM   Result Value Ref Range     Sodium 138 135 - 145 mmol/L     Potassium 5.3 3.4 - 5.3 mmol/L     Carbon Dioxide (CO2) 26 22 - 29 mmol/L     Anion Gap 11 7 - 15 mmol/L     Urea Nitrogen 13.1 8.0 - 23.0 mg/dL     Creatinine 1.16 0.67 - 1.17 mg/dL     GFR Estimate 63 >60 mL/min/1.73m2     Calcium 10.0 8.8 - 10.2 mg/dL     Chloride 101 98 - 107 mmol/L     Glucose 225 (H) 70 - 99 mg/dL     Alkaline Phosphatase 83 40 - 129 U/L     AST 17 0 - 45 U/L     ALT 20 0 - 70 U/L     Protein Total 7.0 6.4 - 8.3 g/dL     Albumin 4.5 3.5 - 5.2 g/dL     Bilirubin Total 0.8 <=1.2 mg/dL   Lipid panel reflex to direct LDL Fasting     Collection Time: 10/13/23  8:14 AM   Result Value Ref Range     Cholesterol 114 <200 mg/dL     Triglycerides 100 <150 mg/dL     Direct Measure HDL 43 >=40 mg/dL     LDL Cholesterol Calculated 51 <=100 mg/dL     Non HDL Cholesterol 71 <130 mg/dL   Hemoglobin A1c     Collection Time: 10/13/23  8:14 AM   Result Value Ref Range     Hemoglobin A1C 7.7 (H) 0.0 - 5.6 %   CBC with platelets     Collection Time: 10/13/23  8:14 AM   Result Value Ref Range     WBC Count 7.3 4.0 - 11.0 10e3/uL     RBC Count 4.32 (L) 4.40 - 5.90 10e6/uL     Hemoglobin 14.2 13.3 - 17.7 g/dL     Hematocrit 39.6 (L) 40.0 - 53.0 %     MCV 92 78 - 100 fL     MCH 32.9 26.5 - 33.0 pg     MCHC 35.9 31.5 - 36.5 g/dL     RDW 13.5 10.0 - 15.0 %     Platelet Count 189 150 - 450 10e3/uL   Prostate Specific Antigen Screen     Collection Time: 10/13/23  8:14 AM   Result Value Ref Range     Prostate Specific Antigen Screen 0.98 ng/mL   Albumin Random Urine Quantitative with Creat Ratio     Collection Time: 10/13/23 12:35 PM   Result Value Ref Range     Creatinine Urine  mg/dL 18.6 mg/dL     Albumin Urine mg/L 28.4 mg/L     Albumin Urine mg/g Cr 152.69 (H) 0.00 - 17.00 mg/g Cr

## 2024-10-25 NOTE — PROGRESS NOTES
"Preventive Care Visit  [facility]  Rajesh Lewis MD, MD, Family Medicine  Oct 25, 2024      Assessment & Plan     Dio was seen today for medicare visit, recheck medication and wellness visit.    Diagnoses and all orders for this visit:    Medicare annual wellness visit, subsequent    Type 2 diabetes mellitus with microalbuminuria, without long-term current use of insulin (H)    Primary hypertension    Hyperlipidemia, unspecified hyperlipidemia type    Screening for prostate cancer    BPH with obstruction/lower urinary tract symptoms        Nicotine/Tobacco Cessation  He reports that he has been smoking cigars. He has never used smokeless tobacco.  Nicotine/Tobacco Cessation Plan  Recommended cessation      BMI  Estimated body mass index is 25.55 kg/m  as calculated from the following:    Height as of this encounter: 1.753 m (5' 9\").    Weight as of this encounter: 78.5 kg (173 lb).   Weight management plan: Discussed healthy diet and exercise guidelines    Counseling  Appropriate preventive services were addressed with this patient via screening, questionnaire, or discussion as appropriate for fall prevention, nutrition, physical activity, Tobacco-use cessation, social engagement, weight loss and cognition.  Checklist reviewing preventive services available has been given to the patient.  Reviewed patient's diet, addressing concerns and/or questions.   He is at risk for lack of exercise and has been provided with information to increase physical activity for the benefit of his well-being.   Patient reported safety concerns were addressed today.        Subjective   Dio is a 84 year old, presenting for the following:  Medicare Visit, Recheck Medication, and Wellness Visit    He has type 2 diabetes.  He is on metformin and glipizide.  Blood sugar has increased.  Discussed options for changing medication therapy.  Reviewed diet and exercise.  Patient declines changing medication at this " time but would like to see how he can do in changing his diet before initiation of additional medication therapy.  Reviewed most recent eye exam.  Foot exam performed today no neuropathy concerns at this time.  Does have microalbuminuria.  He gives a tendency to get hyperkalemia thus we have not used an ACE inhibitor.  We will continue to monitor microalbuminuria as he improves his blood sugar control.  He does have BPH reports symptoms are generally managed.  He has erectile dysfunction no significant concerns brought forth in this regard today.  Reviewed other aspects of health and health prevention.  No other significant concerns identified.  Discussed immunizations.      Via the Health Maintenance questionnaire, the patient has reported the following services have been completed , this information has been sent to the abstraction team.      Do you have a current opioid prescription? no  Do you use any other controlled substances or medications that are not prescribed by a provider?  no        Health Care Directive  Patient does not have a Health Care Directive: Advance Directive received and scanned. Click on Code in the patient header to view.      10/25/2024   General Health   How would you rate your overall physical health? Excellent   Feel stress (tense, anxious, or unable to sleep) Not at all            10/25/2024   Nutrition   Diet: Regular (no restrictions)            10/25/2024   Exercise   Days per week of moderate/strenous exercise 2 days      (!) EXERCISE CONCERN      10/25/2024   Social Factors   Frequency of gathering with friends or relatives Three times a week   Worry food won't last until get money to buy more No   Food not last or not have enough money for food? No   Do you have housing? (Housing is defined as stable permanent housing and does not include staying ouside in a car, in a tent, in an abandoned building, in an overnight shelter, or couch-surfing.) Yes   Are you worried about losing  your housing? No   Lack of transportation? No   Unable to get utilities (heat,electricity)? No            10/25/2024   Fall Risk   Fallen 2 or more times in the past year? No     No    Trouble with walking or balance? Yes     Yes        Patient-reported    Multiple values from one day are sorted in reverse-chronological order           10/25/2024   Activities of Daily Living- Home Safety   Needs help with the following daily activites None of the above   Safety concerns in the home No grab bars in the bathroom            10/25/2024   Dental   Dentist two times every year? Yes            10/25/2024   Hearing Screening   Hearing concerns? None of the above            10/25/2024   Driving Risk Screening   Patient/family members have concerns about driving No            10/25/2024   General Alertness/Fatigue Screening   Have you been more tired than usual lately? No            10/25/2024   Urinary Incontinence Screening   Bothered by leaking urine in past 6 months No            10/25/2024   TB Screening   Were you born outside of the [place]? No            Today's PHQ-2 Score:       10/25/2024    10:25 AM   PHQ-2 ( 1999 [employer])   Q1: Little interest or pleasure in doing things 0    Q2: Feeling down, depressed or hopeless 0    PHQ-2 Score 0    Q1: Little interest or pleasure in doing things Not at all   Q2: Feeling down, depressed or hopeless Not at all   PHQ-2 Score 0       Patient-reported           10/25/2024   Substance Use   Alcohol more than 3/day or more than 7/wk No   Do you have a current opioid prescription? No   How severe/bad is pain from 1 to 10? 0/10 (No Pain)   Do you use any other substances recreationally? No        Social History     Tobacco Use    Smoking status: Some Days     Types: Cigars    Smokeless tobacco: Never    Tobacco comments:     cigars once in a while   Vaping Use    Vaping status: Never Used   Substance Use Topics    Alcohol use: Yes    Drug use: Not Currently                 Reviewed and  "updated as needed this visit by Provider        Current providers sharing in care for this patient include:  Patient Care Team:  Rajesh Lewis MD as PCP - General  Rajesh Lewis MD as Assigned PCP    The following health maintenance items are reviewed in Epic and correct as of today:  Health Maintenance   Topic Date Due    NICOTINE/TOBACCO CESSATION COUNSELING Q 1 YR  Never done    DIABETIC FOOT EXAM  Never done    ANNUAL REVIEW OF HM ORDERS  Never done    Medicare Annual MTM Pharmacist Visit (once per calendar year)  Never done    EYE EXAM  03/21/2024    MEDICARE ANNUAL WELLNESS VISIT  10/20/2024    A1C  04/21/2025    BMP  10/21/2025    LIPID  10/21/2025    MICROALBUMIN  10/24/2025    FALL RISK ASSESSMENT  10/25/2025    ADVANCE CARE PLANNING  10/25/2029    DTAP/TDAP/TD IMMUNIZATION (3 - Td or Tdap) 07/30/2031    PHQ-2 (once per calendar year)  Completed    INFLUENZA VACCINE  Completed    Pneumococcal Vaccine: 65+ Years  Completed    ZOSTER IMMUNIZATION  Completed    RSV VACCINE  Completed    COVID-19 Vaccine  Completed    HPV IMMUNIZATION  Aged Out    MENINGITIS IMMUNIZATION  Aged Out    RSV MONOCLONAL ANTIBODY  Aged Out       Complete review of systems is obtained.  Other than the specific considerations noted above complete review of systems is negative.       Objective    Exam  /78   Pulse 66   Temp 98.2  F (36.8  C)   Resp 18   Ht 1.753 m (5' 9\")   Wt 78.5 kg (173 lb)   SpO2 97%   BMI 25.55 kg/m     Estimated body mass index is 25.55 kg/m  as calculated from the following:    Height as of this encounter: 1.753 m (5' 9\").    Weight as of this encounter: 78.5 kg (173 lb).    Physical Exam        General Appearance:    Alert, cooperative, no distress   Eyes:   No scleral icterus or conjunctival irritation       Ears:    Normal TM's and external ear canals, both ears   Throat:   Lips, mucosa, and tongue normal; teeth and gums normal   Neck:   Supple, symmetrical, trachea midline, no adenopathy; "        thyroid:  No enlargement/tenderness/nodules   Lungs:     Clear to auscultation bilaterally, respirations unlabored, no wheezesor crackles   Heart:    Regular rate and rhythm,  No murmur   Extremities:  No edema, no jointswelling or redness, no evidence of any injuries, palpable dorsalis pedis pulses, palpable posterior tibialis pulses.  No ulcerations.  No significant callus formation.  No other foot deformities.   Skin:  Noconcerning skin findings, no suspicious moles, no rashes   Neurologic:  On gross examination there is no motor or sensory deficit.  Specific examination of the feet using the monofilament line revealsthat there is no absence of sensation.  Patient walks with a normal gait             10/25/2024   Mini Cog   Clock Draw Score 2 Normal   3 Item Recall 3 objects recalled   Mini Cog Total Score 5                 Signed Electronically by: Rajesh Lewis MD, MD

## 2024-10-25 NOTE — LETTER
October 25, 2024      Dio Curran  [id number] [address]        Dear ,    We are writing to inform you of your test results.    Recent Results (from the past 120 hours)   Comprehensive metabolic panel    Collection Time: 10/21/24  7:50 AM   Result Value Ref Range    Sodium 137 135 - 145 mmol/L    Potassium 5.3 3.4 - 5.3 mmol/L    Carbon Dioxide (CO2) 23 22 - 29 mmol/L    Anion Gap 10 7 - 15 mmol/L    Urea Nitrogen 13.8 8.0 - 23.0 mg/dL    Creatinine 1.22 (H) 0.67 - 1.17 mg/dL    GFR Estimate 59 (L) >60 mL/min/1.73m2    Calcium 9.6 8.8 - 10.4 mg/dL    Chloride 104 98 - 107 mmol/L    Glucose 236 (H) 70 - 99 mg/dL    Alkaline Phosphatase 66 40 - 150 U/L    AST 19 0 - 45 U/L    ALT 16 0 - 70 U/L    Protein Total 6.9 6.4 - 8.3 g/dL    Albumin 4.3 3.5 - 5.2 g/dL    Bilirubin Total 0.8 <=1.2 mg/dL    Patient Fasting > 8hrs? Unknown    Lipid panel reflex to direct LDL Fasting    Collection Time: 10/21/24  7:50 AM   Result Value Ref Range    Cholesterol 118 <200 mg/dL    Triglycerides 128 <150 mg/dL    Direct Measure HDL 45 >=40 mg/dL    LDL Cholesterol Calculated 47 <100 mg/dL    Non HDL Cholesterol 73 <130 mg/dL    Patient Fasting > 8hrs? Unknown    Prostate Specific Antigen Screen    Collection Time: 10/21/24  7:50 AM   Result Value Ref Range    Prostate Specific Antigen Screen 1.21 ng/mL   Hemoglobin A1c    Collection Time: 10/21/24  7:50 AM   Result Value Ref Range    Estimated Average Glucose 203 (H) <117 mg/dL    Hemoglobin A1C 8.7 (H) 0.0 - 5.6 %   Albumin Random Urine Quantitative with Creat Ratio    Collection Time: 10/24/24  2:56 PM   Result Value Ref Range    Creatinine Urine mg/dL 49.1 mg/dL    Albumin Urine mg/L 110.0 mg/L    Albumin Urine mg/g Cr 224.03 (H) 0.00 - 17.00 mg/g Cr        If you have any questions or concerns, please call the clinic at the number listed above.       Sincerely,    Dr Lewis and Bobby

## 2024-11-06 DIAGNOSIS — N40.1 BPH WITH OBSTRUCTION/LOWER URINARY TRACT SYMPTOMS: ICD-10-CM

## 2024-11-06 DIAGNOSIS — N13.8 BPH WITH OBSTRUCTION/LOWER URINARY TRACT SYMPTOMS: ICD-10-CM

## 2024-11-06 RX ORDER — TAMSULOSIN HYDROCHLORIDE 0.4 MG/1
0.4 CAPSULE ORAL EVERY EVENING
Qty: 90 CAPSULE | Refills: 3 | Status: SHIPPED | OUTPATIENT
Start: 2024-11-06

## 2025-02-03 DIAGNOSIS — E11.9 DIABETES MELLITUS, TYPE 2 (H): ICD-10-CM

## 2025-02-04 RX ORDER — BLOOD SUGAR DIAGNOSTIC
STRIP MISCELLANEOUS
Qty: 300 STRIP | Refills: 2 | OUTPATIENT
Start: 2025-02-04

## 2025-03-15 ENCOUNTER — HEALTH MAINTENANCE LETTER (OUTPATIENT)
Age: 85
End: 2025-03-15

## 2025-04-16 ENCOUNTER — TRANSFERRED RECORDS (OUTPATIENT)
Dept: HEALTH INFORMATION MANAGEMENT | Facility: CLINIC | Age: 85
End: 2025-04-16
Payer: COMMERCIAL

## 2025-04-16 LAB — RETINOPATHY: POSITIVE

## 2025-04-27 ENCOUNTER — HEALTH MAINTENANCE LETTER (OUTPATIENT)
Age: 85
End: 2025-04-27

## 2025-06-01 DIAGNOSIS — N40.1 BPH WITH OBSTRUCTION/LOWER URINARY TRACT SYMPTOMS: ICD-10-CM

## 2025-06-01 DIAGNOSIS — N13.8 BPH WITH OBSTRUCTION/LOWER URINARY TRACT SYMPTOMS: ICD-10-CM

## 2025-06-02 RX ORDER — TAMSULOSIN HYDROCHLORIDE 0.4 MG/1
CAPSULE ORAL
Qty: 200 CAPSULE | Refills: 2 | Status: SHIPPED | OUTPATIENT
Start: 2025-06-02

## 2025-07-26 DIAGNOSIS — E11.29 TYPE 2 DIABETES MELLITUS WITH MICROALBUMINURIA, WITHOUT LONG-TERM CURRENT USE OF INSULIN (H): ICD-10-CM

## 2025-07-26 DIAGNOSIS — R80.9 TYPE 2 DIABETES MELLITUS WITH MICROALBUMINURIA, WITHOUT LONG-TERM CURRENT USE OF INSULIN (H): ICD-10-CM

## 2025-07-28 RX ORDER — GLIPIZIDE 10 MG/1
10 TABLET ORAL 3 TIMES DAILY
Qty: 300 TABLET | Refills: 2 | Status: SHIPPED | OUTPATIENT
Start: 2025-07-28

## 2025-08-30 DIAGNOSIS — I10 HTN (HYPERTENSION): ICD-10-CM

## 2025-08-30 DIAGNOSIS — E78.5 HYPERLIPIDEMIA: ICD-10-CM

## 2025-09-02 RX ORDER — SIMVASTATIN 40 MG
40 TABLET ORAL AT BEDTIME
Qty: 90 TABLET | Refills: 0 | Status: SHIPPED | OUTPATIENT
Start: 2025-09-02

## 2025-09-02 RX ORDER — ATENOLOL 25 MG/1
25 TABLET ORAL DAILY
Qty: 90 TABLET | Refills: 0 | Status: SHIPPED | OUTPATIENT
Start: 2025-09-02